# Patient Record
Sex: FEMALE | Race: WHITE | NOT HISPANIC OR LATINO | URBAN - METROPOLITAN AREA
[De-identification: names, ages, dates, MRNs, and addresses within clinical notes are randomized per-mention and may not be internally consistent; named-entity substitution may affect disease eponyms.]

---

## 2017-04-15 ENCOUNTER — OUTPATIENT (OUTPATIENT)
Dept: OUTPATIENT SERVICES | Facility: HOSPITAL | Age: 37
LOS: 1 days | Discharge: HOME | End: 2017-04-15

## 2017-06-24 ENCOUNTER — OUTPATIENT (OUTPATIENT)
Dept: OUTPATIENT SERVICES | Facility: HOSPITAL | Age: 37
LOS: 1 days | Discharge: HOME | End: 2017-06-24

## 2017-06-27 DIAGNOSIS — N92.6 IRREGULAR MENSTRUATION, UNSPECIFIED: ICD-10-CM

## 2017-06-28 DIAGNOSIS — N92.6 IRREGULAR MENSTRUATION, UNSPECIFIED: ICD-10-CM

## 2018-02-09 ENCOUNTER — INPATIENT (INPATIENT)
Facility: HOSPITAL | Age: 38
LOS: 1 days | Discharge: HOME | End: 2018-02-11
Attending: INTERNAL MEDICINE

## 2018-02-09 VITALS
HEART RATE: 76 BPM | DIASTOLIC BLOOD PRESSURE: 78 MMHG | RESPIRATION RATE: 17 BRPM | OXYGEN SATURATION: 99 % | SYSTOLIC BLOOD PRESSURE: 150 MMHG | TEMPERATURE: 98 F

## 2018-02-09 DIAGNOSIS — Z98.890 OTHER SPECIFIED POSTPROCEDURAL STATES: Chronic | ICD-10-CM

## 2018-02-09 DIAGNOSIS — I63.9 CEREBRAL INFARCTION, UNSPECIFIED: ICD-10-CM

## 2018-02-09 DIAGNOSIS — M54.12 RADICULOPATHY, CERVICAL REGION: ICD-10-CM

## 2018-02-09 LAB
ALBUMIN SERPL ELPH-MCNC: 4 G/DL — SIGNIFICANT CHANGE UP (ref 3–5.5)
ALP SERPL-CCNC: 52 U/L — SIGNIFICANT CHANGE UP (ref 30–115)
ALT FLD-CCNC: 22 U/L — SIGNIFICANT CHANGE UP (ref 0–41)
ANION GAP SERPL CALC-SCNC: 4 MMOL/L — LOW (ref 7–14)
APTT BLD: 27.7 SEC — SIGNIFICANT CHANGE UP (ref 27–39.2)
AST SERPL-CCNC: 19 U/L — SIGNIFICANT CHANGE UP (ref 0–41)
BASOPHILS # BLD AUTO: 0.04 K/UL — SIGNIFICANT CHANGE UP (ref 0–0.2)
BASOPHILS NFR BLD AUTO: 0.6 % — SIGNIFICANT CHANGE UP (ref 0–1)
BILIRUB SERPL-MCNC: 0.6 MG/DL — SIGNIFICANT CHANGE UP (ref 0.2–1.2)
BUN SERPL-MCNC: 7 MG/DL — LOW (ref 10–20)
CALCIUM SERPL-MCNC: 9.1 MG/DL — SIGNIFICANT CHANGE UP (ref 8.5–10.1)
CHLORIDE SERPL-SCNC: 110 MMOL/L — SIGNIFICANT CHANGE UP (ref 98–110)
CHOLEST SERPL-MCNC: 146 MG/DL — SIGNIFICANT CHANGE UP (ref 100–200)
CK MB BLD-MCNC: 1 % — SIGNIFICANT CHANGE UP (ref 0–4)
CK MB CFR SERPL CALC: 1 NG/ML — SIGNIFICANT CHANGE UP (ref 0.6–6.3)
CK SERPL-CCNC: 84 U/L — SIGNIFICANT CHANGE UP (ref 0–225)
CO2 SERPL-SCNC: 25 MMOL/L — SIGNIFICANT CHANGE UP (ref 17–32)
CREAT SERPL-MCNC: 0.7 MG/DL — SIGNIFICANT CHANGE UP (ref 0.7–1.5)
EOSINOPHIL # BLD AUTO: 0.42 K/UL — SIGNIFICANT CHANGE UP (ref 0–0.7)
EOSINOPHIL NFR BLD AUTO: 6.7 % — SIGNIFICANT CHANGE UP (ref 0–8)
GAS PNL BLDV: SIGNIFICANT CHANGE UP
GLUCOSE SERPL-MCNC: 83 MG/DL — SIGNIFICANT CHANGE UP (ref 70–110)
HCT VFR BLD CALC: 39.3 % — SIGNIFICANT CHANGE UP (ref 37–47)
HGB BLD-MCNC: 13.2 G/DL — LOW (ref 14–18)
IMM GRANULOCYTES NFR BLD AUTO: 0.3 % — SIGNIFICANT CHANGE UP (ref 0.1–0.3)
INR BLD: 0.89 RATIO — SIGNIFICANT CHANGE UP (ref 0.65–1.3)
LYMPHOCYTES # BLD AUTO: 1.94 K/UL — SIGNIFICANT CHANGE UP (ref 1.2–3.4)
LYMPHOCYTES # BLD AUTO: 31.1 % — SIGNIFICANT CHANGE UP (ref 20.5–51.1)
MCHC RBC-ENTMCNC: 30.1 PG — SIGNIFICANT CHANGE UP (ref 27–31)
MCHC RBC-ENTMCNC: 33.6 G/DL — SIGNIFICANT CHANGE UP (ref 32–37)
MCV RBC AUTO: 89.7 FL — SIGNIFICANT CHANGE UP (ref 81–91)
MONOCYTES # BLD AUTO: 0.38 K/UL — SIGNIFICANT CHANGE UP (ref 0.1–0.6)
MONOCYTES NFR BLD AUTO: 6.1 % — SIGNIFICANT CHANGE UP (ref 1.7–9.3)
NEUTROPHILS # BLD AUTO: 3.44 K/UL — SIGNIFICANT CHANGE UP (ref 1.4–6.5)
NEUTROPHILS NFR BLD AUTO: 55.2 % — SIGNIFICANT CHANGE UP (ref 42.2–75.2)
PLATELET # BLD AUTO: 274 K/UL — SIGNIFICANT CHANGE UP (ref 130–400)
POTASSIUM SERPL-MCNC: 3.9 MMOL/L — SIGNIFICANT CHANGE UP (ref 3.5–5)
POTASSIUM SERPL-SCNC: 3.9 MMOL/L — SIGNIFICANT CHANGE UP (ref 3.5–5)
PROT SERPL-MCNC: 6.3 G/DL — SIGNIFICANT CHANGE UP (ref 6–8)
PROTHROM AB SERPL-ACNC: 9.6 SEC — LOW (ref 9.95–12.87)
RBC # BLD: 4.38 M/UL — SIGNIFICANT CHANGE UP (ref 4.2–5.4)
RBC # FLD: 12.1 % — SIGNIFICANT CHANGE UP (ref 11.5–14.5)
SODIUM SERPL-SCNC: 139 MMOL/L — SIGNIFICANT CHANGE UP (ref 135–146)
TROPONIN I SERPL-MCNC: <0.02 NG/ML — SIGNIFICANT CHANGE UP (ref 0–0.05)
WBC # BLD: 6.24 K/UL — SIGNIFICANT CHANGE UP (ref 4.8–10.8)
WBC # FLD AUTO: 6.24 K/UL — SIGNIFICANT CHANGE UP (ref 4.8–10.8)

## 2018-02-09 RX ORDER — SODIUM CHLORIDE 9 MG/ML
1000 INJECTION INTRAMUSCULAR; INTRAVENOUS; SUBCUTANEOUS
Qty: 0 | Refills: 0 | Status: DISCONTINUED | OUTPATIENT
Start: 2018-02-09 | End: 2018-02-11

## 2018-02-09 RX ADMIN — SODIUM CHLORIDE 125 MILLILITER(S): 9 INJECTION INTRAMUSCULAR; INTRAVENOUS; SUBCUTANEOUS at 20:51

## 2018-02-09 NOTE — H&P ADULT - HISTORY OF PRESENT ILLNESS
37 F PMH 1ppd smoking history x 20 years, pw left facial numbness and weakness, upper and lower with left  weakness, starting 3 hours PTA, no trauma, recent illness, other numbness or weakness, headache, vision changes, cough, SOB, pain of any body part.  Exam: HEENT nl, no OP erythema or swelling of oral structures, EOMI, PERRLA 4mm, neck supple nontender, nl ROM, no stepoff, heart RRR, lungs bcta, chest nontender, back nontender in TLS spine. abd ntnd, ext nontender, nl rom, no deformity. Neuro A&Ox3, Left UE no drift, weakness in , elbow extension, and flexion, normal strength 5/5 all 3 other ext, nl sensation throughout limbs, normal speech and coordination, CN VII weakness on left upper and lower, and CN V left facial altered sensation, CN II-XII intact otherwise.  A/P: Stroke code called, spoke with Dr. Headley regarding NIHSS of 1, instructed to monitor on hourly basis if worsening, which may show indication to extend TPA window, as patient has no contraindications after her CT head was negative. Labs, imaging, monitor, EKG, reassess. Likely Stroke unit Disposition.    38 yo right handed female with h/o 20 pack year smoking history, cervical and lumbar herniated disc, right  knee surgery PCOS (not on ocps)chronic headaches, asthma p.w acute onset left face and arm numbness and paresthesias. Patient states that she was having a usual day when around 4.30 am she suddenly experienced sharp stabbing pain in the left shoulder which then radiated to the left face (vi,v2,v3 divisions)and  the left arm , a/w numbness and tingling. Patient states that she is experiencing numbness and paresthesias inside her left cheek and lateral aspect of the tongue on the left side. Denies nausea vomiting dizziness, speech visual deficits ,headaches or focal weakness. Patient currently states that her symptoms have minimally improved since onset.Patient denies h/o blood clots ,use of oral contraceptives ,miscarriages , or h/o autoimmune disorders. 37 F recently diagnosed with PCOS, cervical and lumbar radiculopathy, active smoker (20 p.y), p/w left facial numbness and weakness, left UE numbness and  weakness, starting 3 hours PTA.  as per patient, her symptoms were acute in onset and started as a sharp pain in her L chest that traveled to her L UE and then to her L face.   Patient states that she is experiencing numbness and paresthesias on her left cheek.   no nausea vomiting dizziness, speech visual deficits ,headaches or focal weakness.   Pt denies any motor weakness, no trauma, no recent illness, no other numbness or weakness, no headache, no vision changes, no cough/SOB, no other GI//Resp sx.    no h/o VTE, pt does not use OCPs, no miscarriages, no h/o autoimmune disorders.  ROS otherwise negative.

## 2018-02-09 NOTE — CONSULT NOTE ADULT - ASSESSMENT
38 yo right handed female with h/o PCOS (not on meds)cervical and lumbar disc herniation,20 pack yr smoker, asthma ,chronic headache ,p.w acute onset left shoulder and neck pain radiating to the left face and arm a/w numbness and paresthesias.    r/o CVA  r/o cervical radiculopathy 36 yo right handed female with h/o PCOS (not on meds)cervical and lumbar disc herniation,20 pack yr smoker, asthma ,chronic headache ,p.w acute onset left shoulder and neck pain radiating to the left posterior head and arm a/w numbness and paresthesias.    r/o cervical radiculopathy

## 2018-02-09 NOTE — ED PROVIDER NOTE - NS ED ROS FT
Review of Systems:  	•	CONSTITUTIONAL - no fever, no diaphoresis, no chills  	•	SKIN - no rash  	•	RESPIRATORY - no shortness of breath, no cough  	•	CARDIAC - no chest pain, no palpitations  	•	GI - no abd pain, no nausea, no vomiting, no diarrhea, no constipation  	•	GENITO-URINARY - no discharge, no dysuria; no hematuria, no increased urinary frequency  	•	MUSCULOSKELETAL - no joint paint, no swelling, no redness  	•	NEUROLOGIC - +left hand weakness, left facial drop, no headache, no LOC

## 2018-02-09 NOTE — H&P ADULT - ASSESSMENT
36 yo right handed female with h/o PCOS (not on meds)cervical and lumbar disc herniation,20 pack yr smoker, asthma ,chronic headache ,p.w acute onset left shoulder and neck pain radiating to the left face and arm a/w numbness and paresthesias.    r/o CVA  r/o cervical radiculopathy      -N/c mri brain  -MRA Head N/C  -MRA neck With contrast  -Smoking cessation education(Nicotine patch)  -start asa 81 mg and LIpitor 40 mg  -echo  -Lipid profile, Hbaic  -speech swallow eval  -admit to stroke unit.     Problem/Recommendation - 2:  ·  Problem: Cervical radiculopathy.  Recommendation: -continue tramadol 50 mg bid prn. 36 yo R handed F with h/o PCOS, cervical and lumbar radiculopathy, 20 p.y smoker, p.w acute onset L shoulder and neck pain radiating to the left face and arm a/w numbness and paresthesias in L side of her face and L UE    1- L facial and UE parasthesias:   ddx: CVA vs. cervical radiculopathy     admit to stroke unit  patient did not receive TPA in ED as she was not a candidate due to time of presentation and NHSS score of 1  neurology following, will get MRI head, MRA & neck  check 2 d echo  check LDL and HbA1c  ASA 81 / lipitor 80 qHS  check JOSUE, anti-PL    2- radiculopathy:   continue tramadol 50 mg bid prn  c/w gabapenting 300 q8    3- GI ppx: no need  DVT ppx: lovenox 40 q24    4- full code

## 2018-02-09 NOTE — ED PROVIDER NOTE - PHYSICAL EXAMINATION
Alert, NAD, WDWN, non-toxic appearing  PERRL, EOMI, normal pupils, no icterus, normal external ENT, pink/moist membranes, pharynx normal  Airway intact, normal resp effort w/o tachypnea, speaking full sentences, lungs CTA b/l  CVS1S2, RRR, no m/g/r, no JVD, 2+ pulses b/l, no edema, warm/well-perfused  Abdomen soft, no tender/dist/guard/rebound, no CVA tender  AAOx3, +left facial droop and numbness, no slurred speech, no gaze palsy, +left hand weakness, no drift, no decreased sensation, normal gait

## 2018-02-09 NOTE — H&P ADULT - NSHPSOCIALHISTORY_GEN_ALL_CORE
smokin ppd x 20 y (20 p.y)  alcohol:  drug use: smokin ppd x 20 y (20 p.y)  alcohol: denies  drug use: denies

## 2018-02-09 NOTE — CHART NOTE - NSCHARTNOTEFT_GEN_A_CORE
Called for stroke alert:  37 F w/ h/o tobacco use p/w acute L facial droop with minimal numbness and minimal L  weakness starting 3.5 hour prior.  Denies any vertigo, incoordination or limb weakness/numbness. No visual changes. Symptoms currently persistent.  NIHSS 1 per ED attending.  HCT negative.  Pt is not a candidate for thrombolysis with IV TPA in 4.5 hr window with low NIHSS.  Recommend continue serial neurochecks during TPA window and contact us immediately if symptoms progress. Otherwise admit for further management and workup.  Discussed with Dr. Yousif who agrees with current management and plan.

## 2018-02-09 NOTE — H&P ADULT - NSHPLABSRESULTS_GEN_ALL_CORE
13.2   6.24  )-----------( 274      ( 09 Feb 2018 19:10 )             39.3       02-09    139  |  110  |  7<L>  ----------------------------<  83  3.9   |  25  |  0.7    Ca    9.1      09 Feb 2018 19:10    TPro  6.3  /  Alb  4.0  /  TBili  0.6  /  DBili  x   /  AST  19  /  ALT  22  /  AlkPhos  52  02-09    PT/INR - ( 09 Feb 2018 19:10 )   PT: 9.60 sec;   INR: 0.89 ratio         PTT - ( 09 Feb 2018 19:10 )  PTT:27.7 sec    CARDIAC MARKERS ( 09 Feb 2018 19:10 )  <0.02 ng/mL / x     / 84 U/L / x     / 1.0 ng/mL    CAPILLARY BLOOD GLUCOSE  83 (09 Feb 2018 19:00)    < from: CT Head No Cont (02.09.18 @ 19:07) >    IMPRESSION:  1. No CT evidence of acute intracranial pathology.  2. Left ethmoid sinus disease.    < end of copied text >

## 2018-02-09 NOTE — CONSULT NOTE ADULT - SUBJECTIVE AND OBJECTIVE BOX
CC: Left arm and face numbness and paresthesias.    HPI: 36 yo right handed female with h/o 20 pack year smoking history, cervical and lumbar herniated disc, right  knee surgery PCOS (not on ocps)chronic headaches, asthma p.w acute onset left face and arm numbness and paresthesias. Patient states that she was having a usual day when around 4.30 am she suddenly experienced sharp stabbing pain in the left shoulder which then radiated to the left face (vi,v2,v3 divisions)and  the left arm , a/w numbness and tingling. Patient states that she is experiencing numbness and paresthesias inside her left cheek and lateral aspect of the tongue on the left side. Denies nausea vomiting dizziness, speech visual deficits ,headaches or focal weakness. Patient currently states that her symptoms have minimally improved since onset.Patient denies h/o blood clots ,use of oral contraceptives ,miscarriages , or h/o autoimmune disorders.    Family history: mother with HTN, father with cva    Home medications:  -Gabapentin 400 mg prn  -Robaxin prn  -Tramadol 50 mg prn      ROS:  Constitutional, Neurological, Psychiatric, Eyes, ENT, Cardiovascular, Respiratory, Gastrointestinal, Genitourinary, Musculoskeletal, Integumentary, Endocrine and Heme/Lymph are otherwise negative.     Physical Exam:  Constitutional: alert and in no acute distress.  Eyes: the sclera and conjunctiva were normal, pupils were equal in size, round, reactive to light, with normal accommodation and extraocular movements were intact.   Back: no costovertebral angle tenderness and no spinal tenderness.      Neuro Exam:  Orientation: oriented to person, place time and situation  Attention: normal concentrating ability and visual attention was not decreased.   Language: no difficulty naming common objects, no difficulty repeating a phrase, no difficulty writing a sentence, fluency intact, comprehension intact and reading intact.   Fund of knowledge: displays adequate knowledge of personal past history.   Cranial Nerves: visual acuity intact bilaterally, visual fields full to confrontation, pupils equal round and reactive to light, extraocular motion intact, facial sensation intact symmetrically, face symmetrical, hearing was intact bilaterally, tongue and palate midline, head turning and shoulder shrug symmetric and there was no tongue deviation with protrusion.   Motor: muscle tone was normal in all four extremities, muscle strength was normal in all four extremities and normal bulk in all four extremities.   Sensory exam: decreased to pinprick temperature on the left face and left arm  Coordination:. no dysmetria/ past pointing or limb ataxia  Gait: deferred  Deep tendon reflexes:   Biceps right 2+. Biceps left 2+.    Triceps right 2+. Triceps left 2+.  LOC  Brachioradialis right 2+. Brachioradialis left 2+.    Patella right 2+. Patella left 2+.    Ankle jerk right 2+. Ankle jerk left 2+.   Plantar responses normal on the right, normal on the left.      NIHSS: 1    Allergies    erythromycin (Unknown)    Intolerances      MEDICATIONS  (STANDING):  sodium chloride 0.9%. 1000 milliLiter(s) (125 mL/Hr) IV Continuous <Continuous>    MEDICATIONS  (PRN):      LABS:                        13.2   6.24  )-----------( 274      ( 09 Feb 2018 19:10 )             39.3     02-09    139  |  110  |  7<L>  ----------------------------<  83  3.9   |  25  |  0.7    Ca    9.1      09 Feb 2018 19:10    TPro  6.3  /  Alb  4.0  /  TBili  0.6  /  DBili  x   /  AST  19  /  ALT  22  /  AlkPhos  52  02-09    PT/INR - ( 09 Feb 2018 19:10 )   PT: 9.60 sec;   INR: 0.89 ratio         PTT - ( 09 Feb 2018 19:10 )  PTT:27.7 sec        Neuro Imaging:  < from: CT Head No Cont (02.09.18 @ 19:07) >  No CT evidence of acute intracranial pathology.  2. Left ethmoid sinus disease.      < end of copied text >      EEG:     Echo:   Carotid Doppler:   Telemetry: CC: Left arm and face numbness and paresthesias.    HPI: 38 yo right handed female with h/o 20 pack year smoking history, cervical and lumbar herniated disc, right  knee surgery PCOS (not on ocps)chronic headaches, asthma p.w acute onset left face and arm numbness and paresthesias. Patient states that she was having a usual day when around 4.30 am she suddenly experienced sharp stabbing pain in the left shoulder which then radiated to the post-auricular area and down the left arm , a/w numbness and tingling. Patient states that she is experiencing numbness and paresthesias inside her left cheek and lateral aspect of the tongue on the left side.  symptoms lasted few minutes with spontaneous resolution.  than had a recurrence of symptoms which brought her to the hospital.  Denies nausea vomiting dizziness, speech visual deficits ,headaches or focal weakness. Patient currently states that her symptoms have minimally improved since onset.Patient denies h/o blood clots ,use of oral contraceptives ,miscarriages , or h/o autoimmune disorders.  currently back to baseline.    Family history: mother with HTN, father with cva    Home medications:  -Gabapentin 400 mg prn  -Robaxin prn  -Tramadol 50 mg prn      ROS:  Constitutional, Neurological, Psychiatric, Eyes, ENT, Cardiovascular, Respiratory, Gastrointestinal, Genitourinary, Musculoskeletal, Integumentary, Endocrine and Heme/Lymph are otherwise negative.     Physical Exam:  Constitutional: alert and in no acute distress.  Eyes: the sclera and conjunctiva were normal, pupils were equal in size, round, reactive to light, with normal accommodation and extraocular movements were intact.   Back: no costovertebral angle tenderness and no spinal tenderness.      Neuro Exam:  Orientation: oriented to person, place time and situation  Attention: normal concentrating ability and visual attention was not decreased.   Language: no difficulty naming common objects, no difficulty repeating a phrase, no difficulty writing a sentence, fluency intact, comprehension intact and reading intact.   Fund of knowledge: displays adequate knowledge of personal past history.   Cranial Nerves: visual acuity intact bilaterally, visual fields full to confrontation, pupils equal round and reactive to light, extraocular motion intact, facial sensation intact symmetrically, face symmetrical, hearing was intact bilaterally, tongue and palate midline, head turning and shoulder shrug symmetric and there was no tongue deviation with protrusion.   Motor: muscle tone was normal in all four extremities, muscle strength was normal in all four extremities and normal bulk in all four extremities.   Sensory exam: decreased to pinprick temperature on the left face and left arm  Coordination:. no dysmetria/ past pointing or limb ataxia  Gait: deferred  Deep tendon reflexes:   Biceps right 2+. Biceps left 2+.    Triceps right 2+. Triceps left 2+.  LOC  Brachioradialis right 2+. Brachioradialis left 2+.    Patella right 2+. Patella left 2+.    Ankle jerk right 2+. Ankle jerk left 2+.   Plantar responses normal on the right, normal on the left.      NIHSS: 1 (0 on my exam)    Allergies    erythromycin (Unknown)    Intolerances    MEDICATIONS  (STANDING):  sodium chloride 0.9%. 1000 milliLiter(s) (125 mL/Hr) IV Continuous <Continuous>    MEDICATIONS  (PRN):    LABS:                        13.2   6.24  )-----------( 274      ( 09 Feb 2018 19:10 )             39.3     02-09    139  |  110  |  7<L>  ----------------------------<  83  3.9   |  25  |  0.7    Ca    9.1      09 Feb 2018 19:10    TPro  6.3  /  Alb  4.0  /  TBili  0.6  /  DBili  x   /  AST  19  /  ALT  22  /  AlkPhos  52  02-09    PT/INR - ( 09 Feb 2018 19:10 )   PT: 9.60 sec;   INR: 0.89 ratio         PTT - ( 09 Feb 2018 19:10 )  PTT:27.7 sec      Neuro Imaging:  < from: CT Head No Cont (02.09.18 @ 19:07) >  No CT evidence of acute intracranial pathology.  2. Left ethmoid sinus disease.      < end of copied text >

## 2018-02-09 NOTE — CONSULT NOTE ADULT - ATTENDING COMMENTS
38 yo right handed female with h/o PCOS (not on meds)cervical and lumbar disc herniation,20 pack yr smoker, asthma ,chronic headache ,p.w transient acute onset left shoulder and neck pain radiating to the left posterior head and arm a/w numbness and paresthesias suspicious for cervicalgia.  Currently nonfocal.  Doubt stroke.  r/o cervical radiculopathy.    Plan:  - MRI Cervical spine NC  - if MRI (-), may f/u as outpt in 2 wks  - if patient does not want to stay for MRI, recommend d/c and contact our office to arrange for outpt MRI and further w/u.

## 2018-02-09 NOTE — ED PROVIDER NOTE - OBJECTIVE STATEMENT
37 yr old female, no sig PMH, presenting with left facial droop and numbness, and left hand weakness that started at 4 pm today, denies any headaches, neck pain, abd pain, chest pain, or SOB. Does not take any medications

## 2018-02-09 NOTE — CONSULT NOTE ADULT - CONSULT REQUESTED BY NAME
"Subjective:       Patient ID: Aby Correa is a 24 y.o. female.    Chief Complaint: Mass    HPI     The patient presents she also has complaints of pain in the left axilla, radiating to her left breast.  She had recently been treated for a staph infection, with doxycycline.  When she began having these symptoms, she restarted doxycycline 2 days ago.    The following portions of the patient's history were reviewed and updated as appropriate: allergies, current medications, past family history, past medical history, past social history, past surgical history and problem list.    Review of Systems    Other than history of present illness, noncontributory.    Objective:      Physical Exam    Inflamed nodules noted in the left axilla; mild calor, tender to touch.  Left breast unremarkable.              Assessment:       1. Hidradenitis suppurativa of left axilla        Plan:       Discontinue doxycycline; trial of tetracycline.  Refer to general surgery for evaluation a few days.  Warm compresses to area 2-3 times daily.    "This note will not be shared with the patient."  " medical team

## 2018-02-09 NOTE — CONSULT NOTE ADULT - PROBLEM SELECTOR RECOMMENDATION 9
-N/c mri brain  -MRA Head N/C  -MRA neck With contrast  -echo  -Lipid profile, hbaic  -speech swallow eval  -admit to stroke unit -N/c mri brain  -MRA Head N/C  -MRA neck With contrast  -start asa 81 mg and LIpitor 40 mg  -echo  -Lipid profile, Hbaic  -speech swallow eval  -admit to stroke unit -N/c mri brain  -MRA Head N/C  -MRA neck With contrast  -Smoking cessation education(Nicotine patch)  -start asa 81 mg and LIpitor 40 mg  -echo  -Lipid profile, Hbaic  -speech swallow eval  -admit to stroke unit

## 2018-02-09 NOTE — H&P ADULT - NSHPPHYSICALEXAM_GEN_ALL_CORE
T(C): 36.7 (02-09-18 @ 18:46), Max: 36.7 (02-09-18 @ 18:46)  HR: 66 (02-09-18 @ 21:00) (66 - 76)  BP: 110/64 (02-09-18 @ 21:00) (110/64 - 150/78)  RR: 18 (02-09-18 @ 21:00) (17 - 18)  SpO2: 100% (02-09-18 @ 21:00) (99% - 100%)    PHYSICAL EXAM:    GENERAL: NAD, well-developed  HEAD:  Atraumatic, Normocephalic  EYES:   NECK: Supple, No JVD  CHEST/LUNG: Clear to auscultation bilaterally; No wheeze  HEART: Regular rate and rhythm; No murmurs, rubs, or gallops  ABDOMEN: Soft, Nontender, Nondistended; Bowel sounds present  EXTREMITIES:  2+ Peripheral Pulses, No clubbing, cyanosis, or edema  PSYCH: AAOx3  NEUROLOGY:   SKIN: No rashes or lesions T(C): 36.7 (02-09-18 @ 18:46), Max: 36.7 (02-09-18 @ 18:46)  HR: 66 (02-09-18 @ 21:00) (66 - 76)  BP: 110/64 (02-09-18 @ 21:00) (110/64 - 150/78)  RR: 18 (02-09-18 @ 21:00) (17 - 18)  SpO2: 100% (02-09-18 @ 21:00) (99% - 100%)    PHYSICAL EXAM:    GENERAL: NAD, well-developed  HEAD:  Atraumatic, Normocephalic  EYES: PERRL, EOMI  NECK: Supple, No JVD  CHEST/LUNG: Clear to auscultation bilaterally; No wheeze  HEART: Regular rate and rhythm; No murmurs, rubs, or gallops  ABDOMEN: Soft, Nontender, Nondistended; Bowel sounds present  EXTREMITIES:  2+ Peripheral Pulses, No clubbing, cyanosis, or edema  PSYCH: AAOx3    NEUROLOGY: 5/5 all extremities except 4/5 L UE - CN II-XII WNL except V1, V2, V3  no gait abnormalities, pronator drift negative    SKIN: No rashes or lesions
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
## 2018-02-09 NOTE — ED PROVIDER NOTE - ATTENDING CONTRIBUTION TO CARE
37 F PMH 1ppd smoking history x 20 years, pw left facial numbness and weakness, upper and lower with left  weakness, starting 3 hours PTA, no trauma, recent illness, other numbness or weakness, headache, vision changes, cough, SOB, pain of any body part.  Exam: HEENT nl, no OP erythema or swelling of oral structures, EOMI, PERRLA 4mm, neck supple nontender, nl ROM, no stepoff, heart RRR, lungs bcta, chest nontender, back nontender in TLS spine. abd ntnd, ext nontender, nl rom, no deformity. Neuro A&Ox3, Left UE no drift, weakness in , elbow extension, and flexion, normal strength 5/5 all 3 other ext, nl sensation throughout limbs, normal speech and coordination, CN VII weakness on left upper and lower, and CN V left facial altered sensation, CN II-XII intact otherwise.  A/P: Stroke code called, spoke with Dr. Headley regarding NIHSS of 1, instructed to monitor on hourly basis if worsening, which may show indication to extend TPA window, as patient has no contraindications after her CT head was negative. Labs, imaging, monitor, EKG, reassess. Likely Stroke unit Disposition.

## 2018-02-10 LAB
APPEARANCE UR: CLEAR — SIGNIFICANT CHANGE UP
BILIRUB UR-MCNC: NEGATIVE — SIGNIFICANT CHANGE UP
COLOR SPEC: YELLOW — SIGNIFICANT CHANGE UP
DIFF PNL FLD: NEGATIVE — SIGNIFICANT CHANGE UP
GLUCOSE UR QL: NEGATIVE MG/DL — SIGNIFICANT CHANGE UP
HCG UR QL: NEGATIVE — SIGNIFICANT CHANGE UP
KETONES UR-MCNC: NEGATIVE — SIGNIFICANT CHANGE UP
LEUKOCYTE ESTERASE UR-ACNC: NEGATIVE — SIGNIFICANT CHANGE UP
NITRITE UR-MCNC: NEGATIVE — SIGNIFICANT CHANGE UP
PH UR: 6 — SIGNIFICANT CHANGE UP (ref 5–8)
PROT UR-MCNC: NEGATIVE MG/DL — SIGNIFICANT CHANGE UP
SP GR SPEC: 1.02 — SIGNIFICANT CHANGE UP (ref 1.01–1.03)
UROBILINOGEN FLD QL: 0.2 MG/DL — SIGNIFICANT CHANGE UP (ref 0.2–0.2)

## 2018-02-10 RX ORDER — ASPIRIN/CALCIUM CARB/MAGNESIUM 324 MG
81 TABLET ORAL ONCE
Qty: 0 | Refills: 0 | Status: COMPLETED | OUTPATIENT
Start: 2018-02-10 | End: 2018-02-10

## 2018-02-10 RX ORDER — ASPIRIN/CALCIUM CARB/MAGNESIUM 324 MG
81 TABLET ORAL DAILY
Qty: 0 | Refills: 0 | Status: DISCONTINUED | OUTPATIENT
Start: 2018-02-10 | End: 2018-02-11

## 2018-02-10 RX ORDER — GABAPENTIN 400 MG/1
400 CAPSULE ORAL THREE TIMES A DAY
Qty: 0 | Refills: 0 | Status: DISCONTINUED | OUTPATIENT
Start: 2018-02-10 | End: 2018-02-11

## 2018-02-10 RX ORDER — ATORVASTATIN CALCIUM 80 MG/1
80 TABLET, FILM COATED ORAL AT BEDTIME
Qty: 0 | Refills: 0 | Status: DISCONTINUED | OUTPATIENT
Start: 2018-02-10 | End: 2018-02-11

## 2018-02-10 RX ORDER — TRAMADOL HYDROCHLORIDE 50 MG/1
50 TABLET ORAL EVERY 12 HOURS
Qty: 0 | Refills: 0 | Status: DISCONTINUED | OUTPATIENT
Start: 2018-02-10 | End: 2018-02-11

## 2018-02-10 RX ORDER — ENOXAPARIN SODIUM 100 MG/ML
40 INJECTION SUBCUTANEOUS DAILY
Qty: 0 | Refills: 0 | Status: DISCONTINUED | OUTPATIENT
Start: 2018-02-10 | End: 2018-02-11

## 2018-02-10 RX ADMIN — ATORVASTATIN CALCIUM 80 MILLIGRAM(S): 80 TABLET, FILM COATED ORAL at 22:48

## 2018-02-10 RX ADMIN — Medication 81 MILLIGRAM(S): at 01:31

## 2018-02-10 RX ADMIN — ATORVASTATIN CALCIUM 80 MILLIGRAM(S): 80 TABLET, FILM COATED ORAL at 01:31

## 2018-02-10 RX ADMIN — Medication 81 MILLIGRAM(S): at 12:30

## 2018-02-10 RX ADMIN — GABAPENTIN 400 MILLIGRAM(S): 400 CAPSULE ORAL at 15:48

## 2018-02-10 RX ADMIN — GABAPENTIN 400 MILLIGRAM(S): 400 CAPSULE ORAL at 22:46

## 2018-02-10 RX ADMIN — GABAPENTIN 400 MILLIGRAM(S): 400 CAPSULE ORAL at 06:00

## 2018-02-10 NOTE — ED ADULT NURSE NOTE - PMH
Asthma    Chronic bilateral low back pain without sciatica    PCOS (polycystic ovarian syndrome)    Radiculopathy

## 2018-02-10 NOTE — ED ADULT NURSE NOTE - CHPI ED SYMPTOMS NEG
no blurred vision/no change in level of consciousness/no vomiting/no confusion/no loss of consciousness/no nausea/no dizziness/no fever
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
## 2018-02-11 VITALS
TEMPERATURE: 99 F | HEART RATE: 77 BPM | DIASTOLIC BLOOD PRESSURE: 53 MMHG | RESPIRATION RATE: 18 BRPM | SYSTOLIC BLOOD PRESSURE: 107 MMHG

## 2018-02-11 RX ORDER — GABAPENTIN 400 MG/1
1 CAPSULE ORAL
Qty: 0 | Refills: 0 | COMMUNITY

## 2018-02-11 RX ORDER — TRAMADOL HYDROCHLORIDE 50 MG/1
50 TABLET ORAL
Qty: 0 | Refills: 0 | COMMUNITY

## 2018-02-11 RX ADMIN — GABAPENTIN 400 MILLIGRAM(S): 400 CAPSULE ORAL at 12:02

## 2018-02-11 RX ADMIN — GABAPENTIN 400 MILLIGRAM(S): 400 CAPSULE ORAL at 06:24

## 2018-02-11 RX ADMIN — Medication 81 MILLIGRAM(S): at 11:59

## 2018-02-11 NOTE — PROGRESS NOTE ADULT - SUBJECTIVE AND OBJECTIVE BOX
Patient is a 37y old  Female who presents with a chief complaint of L arm numbness, L facial numbness. (10 Feb 2018 04:24)      INTERVAL HPI/OVERNIGHT EVENTS: no event, no complains  HPI:  37 F recently diagnosed with PCOS, cervical and lumbar radiculopathy, active smoker (20 p.y), p/w left facial numbness and weakness, left UE numbness and  weakness, starting 3 hours PTA.  as per patient, her symptoms were acute in onset and started as a sharp pain in her L chest that traveled to her L UE and then to her L face.   Patient states that she is experiencing numbness and paresthesias on her left cheek.   no nausea vomiting dizziness, speech visual deficits ,headaches or focal weakness.   Pt denies any motor weakness, no trauma, no recent illness, no other numbness or weakness, no headache, no vision changes, no cough/SOB, no other GI//Resp sx.    no h/o VTE, pt does not use OCPs, no miscarriages, no h/o autoimmune disorders.  ROS otherwise negative. (2018 23:16)    PAST MEDICAL & SURGICAL HISTORY:    MEDICATIONS  (STANDING):  aspirin  chewable 81 milliGRAM(s) Oral daily  atorvastatin 80 milliGRAM(s) Oral at bedtime  enoxaparin Injectable 40 milliGRAM(s) SubCutaneous daily  gabapentin 400 milliGRAM(s) Oral three times a day  sodium chloride 0.9%. 1000 milliLiter(s) (125 mL/Hr) IV Continuous <Continuous>    MEDICATIONS  (PRN):  traMADol 50 milliGRAM(s) Oral every 12 hours PRN Moderate Pain (4 - 6)    Home Medications:  gabapentin 400 mg oral tablet: 1 tab(s) orally 3 times a day (10 Feb 2018 00:15)  traMADol: 50 milligram(s) orally 2 times a day, As Needed (10 Feb 2018 00:16)      Vital Signs Last 24 Hrs  T(C): 36.4 (2018 07:43), Max: 36.9 (10 Feb 2018 23:49)  T(F): 97.6 (2018 07:43), Max: 98.4 (10 Feb 2018 23:49)  HR: 72 (2018 07:43) (66 - 76)  BP: 102/56 (2018 07:43) (97/56 - 106/58)  BP(mean): --  RR: 18 (2018 07:43) (18 - 18)  SpO2: 97% (2018 03:32) (97% - 97%)  CAPILLARY BLOOD GLUCOSE          General: NAD, AAO3  CV: S1 S2  Pulm: decreased breath sounds at base  Abd: NT/ND/S +BS  EXT: no clubbing/cyanosis/edema  Neuro: nonfocal    LABS:                        13.2   6.24  )-----------( 274      ( 2018 19:10 )             39.3         139  |  110  |  7<L>  ----------------------------<  83  3.9   |  25  |  0.7    Ca    9.1      2018 19:10    TPro  6.3  /  Alb  4.0  /  TBili  0.6  /  DBili  x   /  AST  19  /  ALT  22  /  AlkPhos  52  09    LIVER FUNCTIONS - ( 2018 19:10 )  Alb: 4.0 g/dL / Pro: 6.3 g/dL / ALK PHOS: 52 U/L / ALT: 22 U/L / AST: 19 U/L / GGT: x           CARDIAC MARKERS ( 2018 19:10 )  <0.02 ng/mL / x     / 84 U/L / x     / 1.0 ng/mL      PT/INR - ( 2018 19:10 )   PT: 9.60 sec;   INR: 0.89 ratio         PTT - ( 2018 19:10 )  PTT:27.7 sec  Urinalysis Basic - ( 2018 19:10 )    Color: Yellow / Appearance: Clear / S.025 / pH: x  Gluc: x / Ketone: Negative  / Bili: Negative / Urobili: 0.2 mg/dL   Blood: x / Protein: Negative mg/dL / Nitrite: Negative   Leuk Esterase: Negative / RBC: x / WBC x   Sq Epi: x / Non Sq Epi: x / Bacteria: x          Pregnancy Profile, Urine: STAT (02-10-18 @ 15:11)  Provider to RN:       pt can go off tele for MRI (02-10-18 @ 20:21)

## 2018-02-11 NOTE — DISCHARGE NOTE ADULT - CARE PROVIDER_API CALL
Yaya Headley), Neurology; Neuromuscular Medicine  [address] [place] [address] [id number]  Phone: [phone]  Fax: [phone]    Internist,   Phone: (   )    -  Fax: (   )    -

## 2018-02-11 NOTE — DISCHARGE NOTE ADULT - CARE PLAN
Principal Discharge DX:	Cervical radiculopathy  Goal:	resolution  Assessment and plan of treatment:	cont current meds. F/u with neuro as oupt

## 2018-02-11 NOTE — PROGRESS NOTE ADULT - ASSESSMENT
36 yo R handed F with h/o PCOS, cervical and lumbar radiculopathy, 20 p.y smoker, p.w acute onset L shoulder and neck pain radiating to the left face and arm a/w numbness and paresthesias in L side of her face and L UE    1- L facial and UE parasthesias:   ddx: CVA vs. cervical radiculopathy   -f/u MRI head, MRA & neck  check LDL and HbA1c  ASA 81 / lipitor 80 qHS for now  check JOSUE, anti-PL    2- radiculopathy:   continue tramadol 50 mg bid prn  c/w gabapenting 300 q8    3- GI ppx: no need  DVT ppx: lovenox 40 q24    4- full code

## 2018-02-11 NOTE — PROGRESS NOTE ADULT - SUBJECTIVE AND OBJECTIVE BOX
Neurology Progress Note    Interval History:  Pt currently stable.  no new events.  Otherwise healthy.    Vital Signs Last 24 Hrs  T(C): 36.4 (2018 07:43), Max: 36.9 (10 Feb 2018 23:49)  T(F): 97.6 (2018 07:43), Max: 98.4 (10 Feb 2018 23:49)  HR: 72 (2018 07:43) (66 - 76)  BP: 102/56 (2018 07:43) (97/56 - 106/58)  BP(mean): --  RR: 18 (2018 07:43) (18 - 18)  SpO2: 97% (2018 03:32) (97% - 97%)    Neurological Exam:   Mental status: Awake, alert and oriented x3.  Recent and remote memory intact.  Naming, repetition and comprehension intact.  Attention/concentration intact.  No dysarthria, no aphasia.  Fund of knowledge appropriate.    Cranial nerves: Pupils equally round and reactive to light, visual fields full, no nystagmus, extraocular muscles intact, V1 through V3 intact bilaterally and symmetric, face symmetric, hearing intact to finger rub, palate elevation symmetric, tongue was midline.  Motor:  MRC grading 5/5 b/l UE/LE.   strength 5/5.  Normal tone and bulk.  No abnormal movements.    Sensation: Intact to light touch, proprioception, and pinprick.   Coordination: No dysmetria on finger-to-nose and heel-to-shin.  No dysdiadokinesia.  Reflexes: 2+ in bilateral UE/LE, downgoing toes bilaterally. (-) [place].  Gait: Narrow and steady. No ataxia.  Romberg negative    MEDICATIONS  (STANDING):  aspirin  chewable 81 milliGRAM(s) Oral daily  atorvastatin 80 milliGRAM(s) Oral at bedtime  enoxaparin Injectable 40 milliGRAM(s) SubCutaneous daily  gabapentin 400 milliGRAM(s) Oral three times a day  sodium chloride 0.9%. 1000 milliLiter(s) (125 mL/Hr) IV Continuous <Continuous>    MEDICATIONS  (PRN):  traMADol 50 milliGRAM(s) Oral every 12 hours PRN Moderate Pain (4 - 6)  Medications:    Labs:  CBC Full  -  ( 2018 19:10 )  WBC Count : 6.24 K/uL  Hemoglobin : 13.2 g/dL  Hematocrit : 39.3 %  Platelet Count - Automated : 274 K/uL  Mean Cell Volume : 89.7 fL  Mean Cell Hemoglobin : 30.1 pg  Mean Cell Hemoglobin Concentration : 33.6 g/dL  Auto Neutrophil # : 3.44 K/uL  Auto Lymphocyte # : 1.94 K/uL  Auto Monocyte # : 0.38 K/uL  Auto Eosinophil # : 0.42 K/uL  Auto Basophil # : 0.04 K/uL  Auto Neutrophil % : 55.2 %  Auto Lymphocyte % : 31.1 %  Auto Monocyte % : 6.1 %  Auto Eosinophil % : 6.7 %  Auto Basophil % : 0.6 %        139  |  110  |  7<L>  ----------------------------<  83  3.9   |  25  |  0.7    Ca    9.1      2018 19:10    TPro  6.3  /  Alb  4.0  /  TBili  0.6  /  DBili  x   /  AST  19  /  ALT  22  /  AlkPhos  52  -    LIVER FUNCTIONS - ( 2018 19:10 )  Alb: 4.0 g/dL / Pro: 6.3 g/dL / ALK PHOS: 52 U/L / ALT: 22 U/L / AST: 19 U/L / GGT: x           PT/INR - ( 2018 19:10 )   PT: 9.60 sec;   INR: 0.89 ratio         PTT - ( 2018 19:10 )  PTT:27.7 sec  Urinalysis Basic - ( 2018 19:10 )    Color: Yellow / Appearance: Clear / S.025 / pH: x  Gluc: x / Ketone: Negative  / Bili: Negative / Urobili: 0.2 mg/dL   Blood: x / Protein: Negative mg/dL / Nitrite: Negative   Leuk Esterase: Negative / RBC: x / WBC x   Sq Epi: x / Non Sq Epi: x / Bacteria: x    MRI Head (prelim) negative  MRA H/N (prelim) negative    Assessment:  This is a 37y Female w/ neck pain and L lower face/shoulder/arm numbness currently stable with negative workup.  suspect cervical disease.  can be worked up as outpatient.     Plan:  - f/u MRI/A official reports  - no need for echo  - if MRI/a (-), may d/c with outpt f/u in 4 wks

## 2018-02-11 NOTE — DISCHARGE NOTE ADULT - MEDICATION SUMMARY - MEDICATIONS TO TAKE
I will START or STAY ON the medications listed below when I get home from the hospital:    traMADol  -- 50 milligram(s) by mouth 2 times a day, As Needed  -- Indication: For Radiculopathy    gabapentin 400 mg oral tablet  -- 1 tab(s) by mouth 3 times a day  -- Indication: For Radiculopathy

## 2018-02-11 NOTE — DISCHARGE NOTE ADULT - PATIENT PORTAL LINK FT
You can access the [facility] Patient Portal, offered by [facility], by registering with the following website: [email or web address]

## 2018-02-13 DIAGNOSIS — M54.12 RADICULOPATHY, CERVICAL REGION: ICD-10-CM

## 2018-02-13 DIAGNOSIS — R20.0 ANESTHESIA OF SKIN: ICD-10-CM

## 2018-02-13 DIAGNOSIS — F17.210 NICOTINE DEPENDENCE, CIGARETTES, UNCOMPLICATED: ICD-10-CM

## 2018-02-13 DIAGNOSIS — M54.16 RADICULOPATHY, LUMBAR REGION: ICD-10-CM

## 2018-02-13 DIAGNOSIS — E28.2 POLYCYSTIC OVARIAN SYNDROME: ICD-10-CM

## 2018-06-12 ENCOUNTER — EMERGENCY (EMERGENCY)
Facility: HOSPITAL | Age: 38
LOS: 0 days | Discharge: HOME | End: 2018-06-12
Attending: EMERGENCY MEDICINE | Admitting: EMERGENCY MEDICINE

## 2018-06-12 VITALS
SYSTOLIC BLOOD PRESSURE: 111 MMHG | DIASTOLIC BLOOD PRESSURE: 66 MMHG | HEART RATE: 60 BPM | TEMPERATURE: 97 F | RESPIRATION RATE: 16 BRPM | OXYGEN SATURATION: 100 %

## 2018-06-12 VITALS
HEART RATE: 83 BPM | DIASTOLIC BLOOD PRESSURE: 85 MMHG | RESPIRATION RATE: 18 BRPM | HEIGHT: 68 IN | SYSTOLIC BLOOD PRESSURE: 130 MMHG | OXYGEN SATURATION: 98 % | TEMPERATURE: 98 F | WEIGHT: 179.9 LBS

## 2018-06-12 DIAGNOSIS — J45.909 UNSPECIFIED ASTHMA, UNCOMPLICATED: ICD-10-CM

## 2018-06-12 DIAGNOSIS — Z98.890 OTHER SPECIFIED POSTPROCEDURAL STATES: ICD-10-CM

## 2018-06-12 DIAGNOSIS — R10.31 RIGHT LOWER QUADRANT PAIN: ICD-10-CM

## 2018-06-12 DIAGNOSIS — R10.11 RIGHT UPPER QUADRANT PAIN: ICD-10-CM

## 2018-06-12 DIAGNOSIS — Z98.890 OTHER SPECIFIED POSTPROCEDURAL STATES: Chronic | ICD-10-CM

## 2018-06-12 DIAGNOSIS — Z79.891 LONG TERM (CURRENT) USE OF OPIATE ANALGESIC: ICD-10-CM

## 2018-06-12 DIAGNOSIS — R11.0 NAUSEA: ICD-10-CM

## 2018-06-12 DIAGNOSIS — Z88.1 ALLERGY STATUS TO OTHER ANTIBIOTIC AGENTS STATUS: ICD-10-CM

## 2018-06-12 LAB
ALBUMIN SERPL ELPH-MCNC: 4.5 G/DL — SIGNIFICANT CHANGE UP (ref 3.5–5.2)
ALP SERPL-CCNC: 55 U/L — SIGNIFICANT CHANGE UP (ref 30–115)
ALT FLD-CCNC: 15 U/L — SIGNIFICANT CHANGE UP (ref 0–41)
ANION GAP SERPL CALC-SCNC: 14 MMOL/L — SIGNIFICANT CHANGE UP (ref 7–14)
APTT BLD: 29.4 SEC — SIGNIFICANT CHANGE UP (ref 27–39.2)
AST SERPL-CCNC: 15 U/L — SIGNIFICANT CHANGE UP (ref 0–41)
BASOPHILS # BLD AUTO: 0.04 K/UL — SIGNIFICANT CHANGE UP (ref 0–0.2)
BASOPHILS NFR BLD AUTO: 0.4 % — SIGNIFICANT CHANGE UP (ref 0–1)
BILIRUB DIRECT SERPL-MCNC: <0.2 MG/DL — SIGNIFICANT CHANGE UP (ref 0–0.2)
BILIRUB INDIRECT FLD-MCNC: >0.1 MG/DL — LOW (ref 0.2–1.2)
BILIRUB SERPL-MCNC: 0.3 MG/DL — SIGNIFICANT CHANGE UP (ref 0.2–1.2)
BUN SERPL-MCNC: 10 MG/DL — SIGNIFICANT CHANGE UP (ref 10–20)
CALCIUM SERPL-MCNC: 9.5 MG/DL — SIGNIFICANT CHANGE UP (ref 8.5–10.1)
CHLORIDE SERPL-SCNC: 104 MMOL/L — SIGNIFICANT CHANGE UP (ref 98–110)
CO2 SERPL-SCNC: 24 MMOL/L — SIGNIFICANT CHANGE UP (ref 17–32)
CREAT SERPL-MCNC: 0.9 MG/DL — SIGNIFICANT CHANGE UP (ref 0.7–1.5)
EOSINOPHIL # BLD AUTO: 0.34 K/UL — SIGNIFICANT CHANGE UP (ref 0–0.7)
EOSINOPHIL NFR BLD AUTO: 3.8 % — SIGNIFICANT CHANGE UP (ref 0–8)
GLUCOSE SERPL-MCNC: 68 MG/DL — LOW (ref 70–99)
HCT VFR BLD CALC: 39.1 % — SIGNIFICANT CHANGE UP (ref 37–47)
HGB BLD-MCNC: 13.4 G/DL — SIGNIFICANT CHANGE UP (ref 12–16)
IMM GRANULOCYTES NFR BLD AUTO: 0.3 % — SIGNIFICANT CHANGE UP (ref 0.1–0.3)
INR BLD: 1.01 RATIO — SIGNIFICANT CHANGE UP (ref 0.65–1.3)
LIDOCAIN IGE QN: 32 U/L — SIGNIFICANT CHANGE UP (ref 7–60)
LYMPHOCYTES # BLD AUTO: 2.26 K/UL — SIGNIFICANT CHANGE UP (ref 1.2–3.4)
LYMPHOCYTES # BLD AUTO: 25.2 % — SIGNIFICANT CHANGE UP (ref 20.5–51.1)
MCHC RBC-ENTMCNC: 30.5 PG — SIGNIFICANT CHANGE UP (ref 27–31)
MCHC RBC-ENTMCNC: 34.3 G/DL — SIGNIFICANT CHANGE UP (ref 32–37)
MCV RBC AUTO: 88.9 FL — SIGNIFICANT CHANGE UP (ref 81–99)
MONOCYTES # BLD AUTO: 0.53 K/UL — SIGNIFICANT CHANGE UP (ref 0.1–0.6)
MONOCYTES NFR BLD AUTO: 5.9 % — SIGNIFICANT CHANGE UP (ref 1.7–9.3)
NEUTROPHILS # BLD AUTO: 5.76 K/UL — SIGNIFICANT CHANGE UP (ref 1.4–6.5)
NEUTROPHILS NFR BLD AUTO: 64.4 % — SIGNIFICANT CHANGE UP (ref 42.2–75.2)
NRBC # BLD: 0 /100 WBCS — SIGNIFICANT CHANGE UP (ref 0–0)
PLATELET # BLD AUTO: 318 K/UL — SIGNIFICANT CHANGE UP (ref 130–400)
POTASSIUM SERPL-MCNC: 3.6 MMOL/L — SIGNIFICANT CHANGE UP (ref 3.5–5)
POTASSIUM SERPL-SCNC: 3.6 MMOL/L — SIGNIFICANT CHANGE UP (ref 3.5–5)
PROT SERPL-MCNC: 7 G/DL — SIGNIFICANT CHANGE UP (ref 6–8)
PROTHROM AB SERPL-ACNC: 10.9 SEC — SIGNIFICANT CHANGE UP (ref 9.95–12.87)
RBC # BLD: 4.4 M/UL — SIGNIFICANT CHANGE UP (ref 4.2–5.4)
RBC # FLD: 12.2 % — SIGNIFICANT CHANGE UP (ref 11.5–14.5)
SODIUM SERPL-SCNC: 142 MMOL/L — SIGNIFICANT CHANGE UP (ref 135–146)
WBC # BLD: 8.96 K/UL — SIGNIFICANT CHANGE UP (ref 4.8–10.8)
WBC # FLD AUTO: 8.96 K/UL — SIGNIFICANT CHANGE UP (ref 4.8–10.8)

## 2018-06-12 RX ORDER — MORPHINE SULFATE 50 MG/1
6 CAPSULE, EXTENDED RELEASE ORAL ONCE
Qty: 0 | Refills: 0 | Status: DISCONTINUED | OUTPATIENT
Start: 2018-06-12 | End: 2018-06-12

## 2018-06-12 RX ADMIN — MORPHINE SULFATE 6 MILLIGRAM(S): 50 CAPSULE, EXTENDED RELEASE ORAL at 21:12

## 2018-06-12 NOTE — ED PROVIDER NOTE - PHYSICAL EXAMINATION
Physical Exam    Vital Signs: I have reviewed the initial vital signs.  Constitutional: well-nourished, appears stated age, no acute distress  Eyes: Conjunctiva pink, Sclera clear, PERRLA, EOMI.  Cardiovascular: S1 and S2, regular rate, regular rhythm, well-perfused extremities, radial pulses equal and 2+  Respiratory: unlabored respiratory effort, clear to auscultation bilaterally no wheezing, rales and rhonchi  Gastrointestinal: soft, + RUQ/RLQ tender abdomen, no pulsatile mass, normal bowl sounds, negative psoas, obturator, rovising and murphys signs   Musculoskeletal: supple neck, no lower extremity edema, no midline tenderness  Integumentary: warm, dry, no rash  Neurologic: awake, alert, cranial nerves II-XII grossly intact, extremities’ motor and sensory functions grossly intact  Psychiatric: appropriate mood, appropriate affect

## 2018-06-12 NOTE — ED ADULT TRIAGE NOTE - CHIEF COMPLAINT QUOTE
Sent from urgi to r/o appendicitis.  C/O right sided pain and low grade fever since 4pm.  "Feels like my insides are swelling"

## 2018-06-12 NOTE — ED PROVIDER NOTE - OBJECTIVE STATEMENT
38 year old female past medical history of PCOS and asthma states that she has been having pain in her RUQ/RLQ  with nausea all day. patient seen in out patient urgent care center and told that she had possibly acute appendicitis and to come to emergency room. no fever/chills

## 2018-06-12 NOTE — ED PROVIDER NOTE - PROGRESS NOTE DETAILS
D/w patient all results and copies given. ABD + BS Soft NT/ND. d/w patient return to emergency room precautions and follow up with PMD and GI.

## 2018-06-12 NOTE — ED PROVIDER NOTE - ATTENDING CONTRIBUTION TO CARE
38yF  pmhx pcos recently dxd asthma radiculopathy  p/w  ruq rlq  pain   no fever nausea vomiting  or change in BM - seen in  ucc - sent to ed for r/o appy.   NO urinary  changes  no vaginal bleeding  PE: AVSS, exam as noted, CTAB, RRR, abdomen  ttp RUQ mild RLQ, (+) bowel sounds, . no pelvic tenderness - pln  labs urine u/s , CT

## 2018-06-12 NOTE — ED PROVIDER NOTE - CHPI ED SYMPTOMS NEG
no diarrhea/no dysuria/no abdominal distension/no burning urination/no blood in stool/no chills/no fever/no hematuria/no vomiting

## 2018-12-01 ENCOUNTER — OUTPATIENT (OUTPATIENT)
Dept: OUTPATIENT SERVICES | Facility: HOSPITAL | Age: 38
LOS: 1 days | End: 2018-12-01
Payer: MEDICARE

## 2018-12-01 DIAGNOSIS — Z98.890 OTHER SPECIFIED POSTPROCEDURAL STATES: Chronic | ICD-10-CM

## 2018-12-01 PROCEDURE — G9001: CPT

## 2018-12-03 DIAGNOSIS — Z71.89 OTHER SPECIFIED COUNSELING: ICD-10-CM

## 2018-12-03 PROBLEM — M54.5 LOW BACK PAIN: Chronic | Status: ACTIVE | Noted: 2018-02-10

## 2018-12-03 PROBLEM — J45.909 UNSPECIFIED ASTHMA, UNCOMPLICATED: Chronic | Status: ACTIVE | Noted: 2018-02-10

## 2018-12-03 PROBLEM — M54.10 RADICULOPATHY, SITE UNSPECIFIED: Chronic | Status: ACTIVE | Noted: 2018-02-09

## 2018-12-03 PROBLEM — E28.2 POLYCYSTIC OVARIAN SYNDROME: Chronic | Status: ACTIVE | Noted: 2018-02-09

## 2019-03-08 NOTE — STROKE CODE NOTE - NIH STROKE SCALE: 2. BEST GAZE, QM
[facility] Progress Note    Date: 2019    Name: Carlos Ruffin    MRN: [id number]         : 1952      Nplate    Ms. Brian Mayo arrived to [facility] at 3608 ambulatory. Complained of generalized pain 8/10. Ms. Brian Mayo was assessed and education was provided. Next cbc due on 3/22/19. Dose for today will remain at 138 mcg. Ms. Caridad Bates vitals were reviewed. Visit Vitals  /79 (BP 1 Location: Right arm, BP Patient Position: Sitting)   Pulse 61   Temp 98.1 °F (36.7 °C)   Resp 18       Patient to receive 2 mcg/kg based on initial weight of 69 kg. Nplate 245 mcg (3.50AO) administered as ordered SQ in patient's right upper arm. No redness or bleeding noted. Ms. Brian Mayo was discharged from [place] [address] in stable condition at [phone]. She is to return on 3/15/19 at 0900 for her next appointment.     Rehan Dick RN  2019   0914 (0) Normal

## 2019-06-11 NOTE — PROGRESS NOTE ADULT - NSHPATTENDINGPLANDISCUSS_GEN_ALL_CORE
Sudden numbness or weakness of the face, arm, or leg, especially on one side of the body. Confusion, trouble speaking or understanding. Trouble seeing in one or both eyes. Trouble walking, dizziness, loss of balance or coordination. Severe headache.
stroke unit RN
house staff and nursing

## 2020-01-16 NOTE — ED ADULT NURSE NOTE - PSYCHOSOCIAL WDL
No protocol   Pt seen 11/04/2019  Last refill was 12/13/2019  Ok to refill?   Alert and oriented x 3, normal mood and affect, no apparent risk to self or others.

## 2020-02-25 ENCOUNTER — EMERGENCY (EMERGENCY)
Facility: HOSPITAL | Age: 40
LOS: 0 days | Discharge: HOME | End: 2020-02-26
Attending: EMERGENCY MEDICINE | Admitting: EMERGENCY MEDICINE
Payer: MEDICAID

## 2020-02-25 VITALS
TEMPERATURE: 98 F | SYSTOLIC BLOOD PRESSURE: 125 MMHG | OXYGEN SATURATION: 99 % | DIASTOLIC BLOOD PRESSURE: 70 MMHG | HEART RATE: 80 BPM | RESPIRATION RATE: 18 BRPM

## 2020-02-25 VITALS
RESPIRATION RATE: 18 BRPM | SYSTOLIC BLOOD PRESSURE: 147 MMHG | DIASTOLIC BLOOD PRESSURE: 72 MMHG | WEIGHT: 184.97 LBS | HEART RATE: 88 BPM | OXYGEN SATURATION: 98 % | TEMPERATURE: 99 F
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
## 2020-02-25 DIAGNOSIS — R10.31 RIGHT LOWER QUADRANT PAIN: ICD-10-CM

## 2020-02-25 DIAGNOSIS — R10.9 UNSPECIFIED ABDOMINAL PAIN: ICD-10-CM

## 2020-02-25 DIAGNOSIS — Z98.890 OTHER SPECIFIED POSTPROCEDURAL STATES: Chronic | ICD-10-CM
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
## 2020-02-25 DIAGNOSIS — R10.32 LEFT LOWER QUADRANT PAIN: ICD-10-CM

## 2020-02-25 DIAGNOSIS — F17.210 NICOTINE DEPENDENCE, CIGARETTES, UNCOMPLICATED: ICD-10-CM
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
## 2020-02-25 PROCEDURE — 99284 EMERGENCY DEPT VISIT MOD MDM: CPT
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
## 2020-02-26 NOTE — ED PROVIDER NOTE - PATIENT PORTAL LINK FT
You can access the FollowMyHealth Patient Portal offered by [facility] by registering at the following website: [email or web address] By joining [email or web address]’s FollowMyHealth portal, you will also be able to view your health information using other applications (apps) compatible with our system.

## 2020-02-26 NOTE — ED PROVIDER NOTE - PROVIDER TOKENS
FREE:[LAST:[your primary care provider and gynecologist],PHONE:[(   )    -],FAX:[(   )    -],FOLLOWUP:[1-3 Days]]

## 2020-02-26 NOTE — ED PROVIDER NOTE - NS ED ROS FT
EYES: (-) vision changes, (-) blurry vision, (-) double vision  NECK: (-) neck pain, (-) neck stiffness  CARDIO: (-) chest pain, (-) palpitations  PULM: (-) cough, (-) sputum, (-) shortness of breath  GI: see HPI, (-) nausea, (-) vomiting, (-) diarrhea, (-) constipation, (-) bowel incontinence/retention  : (-) dysuria, (-) hematuria, (-) frequency, (-) urgency, (-) incontinence, (-) difficulty urinating, (-) urinary retention, (-) flank pain  HEME: (-) easy bruising, (-) easy bleeding  MSK: (-) back pain, (-) deformity, (-) joint swelling, (-) gait difficulty  SKIN: (-) rashes, (-) wounds, (-) pallor, (-) ecchymosis  NEURO: (-) headache, (-) head injury, (-) LOC, (-) dizziness, (-) lightheadedness, (-) numbness, (-) weakness, (-) paresthesias    *all other systems negative except as documented above and in the HPI*
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
## 2020-02-26 NOTE — ED PROVIDER NOTE - CARE PROVIDER_API CALL
your primary care provider and gynecologist,   Phone: (   )    -  Fax: (   )    -  Follow Up Time: 1-3 Days
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
## 2020-02-26 NOTE — ED PROVIDER NOTE - PROGRESS NOTE DETAILS
JONI: bedside FAST negative. patient to f/u with pmd and gyn, verbalizes understanding of return precautions.

## 2020-02-27 LAB
CULTURE RESULTS: SIGNIFICANT CHANGE UP
SPECIMEN SOURCE: SIGNIFICANT CHANGE UP

## 2020-05-01 ENCOUNTER — OUTPATIENT (OUTPATIENT)
Dept: OUTPATIENT SERVICES | Facility: HOSPITAL | Age: 40
LOS: 1 days | End: 2020-05-01
Payer: MEDICARE

## 2020-05-01 DIAGNOSIS — Z98.890 OTHER SPECIFIED POSTPROCEDURAL STATES: Chronic | ICD-10-CM

## 2020-05-01 PROCEDURE — G9001: CPT

## 2020-05-08 DIAGNOSIS — Z71.89 OTHER SPECIFIED COUNSELING: ICD-10-CM

## 2023-11-20 NOTE — ED ADULT NURSE NOTE - NSSEPSISSUSPECTED_ED_A_ED
Bety Garcia Patient Age: 67 year old  MESSAGE: Interpreting service used: No    Insurance on file confirmed with caller: Yes    IM/FP- Symptom-  Adult-  Other-     Symptom(s): cough w/yellow/green phlegm, temp of 99.4, hoarse          Appointment: Caller declined making an appointment before speaking with the nurse.    Site: [employer]- Route message to provider's clinical support pool    Advised caller that a nurse will return the call.     Message read back to caller for accuracy: Yes            ALLERGIES:  Sulfa antibiotics  Current Outpatient Medications   Medication Sig Dispense Refill   • levothyroxine 125 MCG tablet Take 1 tablet by mouth daily. 90 tablet 1   • atorvastatin (LIPITOR) 20 MG tablet Take 1 tablet by mouth daily. 90 tablet 1   • metoPROLOL succinate (TOPROL-XL) 50 MG 24 hr tablet Take 1 tablet by mouth daily. 90 tablet 1   • albuterol 108 (90 Base) MCG/ACT inhaler Inhale 2 puffs into the lungs every 4 hours as needed for Shortness of Breath or Wheezing. 1 each 0   • nitroGLYCERIN (NITROSTAT) 0.4 MG sublingual tablet Place 1 tablet under the tongue every 5 minutes as needed for Chest pain. 30 tablet 0   • aspirin 81 MG chewable tablet Chew 1 tablet by mouth daily.     • meclizine (ANTIVERT) 25 MG tablet Take 1 tablet by mouth 3 times daily as needed for Dizziness. 30 tablet 0   • acetaminophen (TYLENOL) 650 MG CR tablet as needed.       No current facility-administered medications for this visit.     PHARMACY to use: shown below            Pharmacy preference(s) on file:   [id number] - [place], [place] - [address] AT [facility] [address]  [address]  [facility] [address]  Phone: [phone] Fax: [phone]      CALL BACK INFO: Ok to leave response (including medical information) with family member or on answering machine      PCP: Izzy Humphreys MD         INS: Payor: DEVOTED HE[employer] PLANS / Plan: MEDICARE ADVANTAGE PPO / Product Type: MEDADV   PATIENT  ADDRESS:  [address] [place] [phone]     No

## 2024-01-01 NOTE — PATIENT PROFILE ADULT. - ASSIST WITH
SCN Discharge Note & Instructions      Age: 2 week old  Corrected age:37w0d  Discharge weight: 6 lb 2.8 oz (2800 g)  Discharge length:19.29\" (49 cm) (03/24/24 2300)    Discharge head measurement: 31.5 cm (12.4\") (03/24/24 2300)    Birth Information:  Date and time of delivery: 2024  4:46 PM  Delivery method: Vaginal, Spontaneous [250]  Apgars at 1 and 5 minutes: 9 & 9  Birth weight: 5 lb 10.8 oz (2575 g)    Birth length: 18\"  Birth head measurement: 31.5 cm    Lab & Screening Information:  Infant blood type: Mom is B+ / baby not tested    Hearing screen passed  Carseat screen passed  CCHD  passed      Bilirubin   Bilirubin, Total (mg/dL)   Date Value   2024 8.4 (H)          Immunizations Given:   Most Recent Immunizations   Administered Date(s) Administered    Hep B, adolescent or pediatric 2024          Equipment at Discharge:  Your pediatrician will follow your baby's progress related to these items.  Please contact him/her with any questions or concerns.    What to expect:  Expect about 6-8 wet diapers daily.  The urine should be pale in color and have little odor.  Most babies have a bowel movement after every breastfeeding or at least several times a day.    Special considerations for Preemies:    Once your baby is ready to go home, preemies are much like other babies. However you will need to be extra careful for certain things:  Protect your baby from infections. You should wash your hands often with soap and water, and so should anybody who takes care of your baby. Limit contact with visitors, and avoid crowded public areas. If people in the household are ill, try to limit their contact with the baby.  Make your house and car 'no smoking zones.' Anybody in the household who smokes should quit. Visitors, or household members who can't quit, should smoke only outside, away from doors and windows.  If your baby has an apnea monitor, make sure you can hear it from every room in the house. Other  small radio frequency devices (iPods/cell phones) may cause interference, so keep these devices 3 feet away from the apnea monitor.  Feel free to take your baby outside, but avoid long exposure to drafts or direct sunlight.      When To Call The Doctor:    Call your pediatrician or family doctor if your baby:  Has a fever of 100.4 degrees F or higher  Is feeding poorly  Is having difficulty breathing  Is extremely irritable  Is listless and tired  Is forcefully vomiting  Falls from bed or table  Is dropped or severely shaken  Cries constantly  Has an unusual rash  Has watery runny stools  Has mucus or blood in stools    In case you need to call the doctor with any of the above:  Take your baby's temperature and write it down.  Know how much and how many feedings she has had that day.  Note amount, color, consistency, of urine and stools.  Note any changes in your baby's behavior such as sleepy, very fussy or less active.      Theresa Vogt RN  2024 at 7:26 AM   (2) well flexed independent

## 2024-01-23 ENCOUNTER — EMERGENCY (EMERGENCY)
Facility: HOSPITAL | Age: 44
LOS: 0 days | Discharge: HOME | End: 2024-01-23
Attending: EMERGENCY MEDICINE
Payer: MEDICARE

## 2024-01-23 VITALS
DIASTOLIC BLOOD PRESSURE: 72 MMHG | RESPIRATION RATE: 18 BRPM | OXYGEN SATURATION: 99 % | SYSTOLIC BLOOD PRESSURE: 141 MMHG | WEIGHT: 210.1 LBS | TEMPERATURE: 98 F | HEART RATE: 95 BPM | HEIGHT: 67 IN

## 2024-01-23 DIAGNOSIS — K08.89 OTHER SPECIFIED DISORDERS OF TEETH AND SUPPORTING STRUCTURES: ICD-10-CM

## 2024-01-23 DIAGNOSIS — J45.909 UNSPECIFIED ASTHMA, UNCOMPLICATED: ICD-10-CM

## 2024-01-23 DIAGNOSIS — Z88.1 ALLERGY STATUS TO OTHER ANTIBIOTIC AGENTS STATUS: ICD-10-CM

## 2024-01-23 DIAGNOSIS — F17.200 NICOTINE DEPENDENCE, UNSPECIFIED, UNCOMPLICATED: ICD-10-CM

## 2024-01-23 DIAGNOSIS — R22.0 LOCALIZED SWELLING, MASS AND LUMP, HEAD: ICD-10-CM

## 2024-01-23 DIAGNOSIS — K03.81 CRACKED TOOTH: ICD-10-CM

## 2024-01-23 DIAGNOSIS — E28.2 POLYCYSTIC OVARIAN SYNDROME: ICD-10-CM

## 2024-01-23 DIAGNOSIS — Z98.890 OTHER SPECIFIED POSTPROCEDURAL STATES: Chronic | ICD-10-CM

## 2024-01-23 PROCEDURE — 99284 EMERGENCY DEPT VISIT MOD MDM: CPT | Mod: FS

## 2024-01-23 PROCEDURE — 99283 EMERGENCY DEPT VISIT LOW MDM: CPT | Mod: 25

## 2024-01-23 PROCEDURE — 96372 THER/PROPH/DIAG INJ SC/IM: CPT

## 2024-01-23 RX ORDER — KETOROLAC TROMETHAMINE 30 MG/ML
30 SYRINGE (ML) INJECTION ONCE
Refills: 0 | Status: DISCONTINUED | OUTPATIENT
Start: 2024-01-23 | End: 2024-01-23

## 2024-01-23 RX ORDER — AMOXICILLIN 250 MG/5ML
875 SUSPENSION, RECONSTITUTED, ORAL (ML) ORAL
Refills: 0 | Status: DISCONTINUED | OUTPATIENT
Start: 2024-01-23 | End: 2024-01-23

## 2024-01-23 RX ORDER — KETOROLAC TROMETHAMINE 30 MG/ML
1 SYRINGE (ML) INJECTION
Qty: 15 | Refills: 0
Start: 2024-01-23 | End: 2024-01-27

## 2024-01-23 RX ORDER — ACETAMINOPHEN 500 MG
975 TABLET ORAL ONCE
Refills: 0 | Status: COMPLETED | OUTPATIENT
Start: 2024-01-23 | End: 2024-01-23

## 2024-01-23 RX ORDER — AMOXICILLIN 250 MG/5ML
1 SUSPENSION, RECONSTITUTED, ORAL (ML) ORAL
Qty: 21 | Refills: 0
Start: 2024-01-23 | End: 2024-01-29

## 2024-01-23 RX ADMIN — Medication 30 MILLIGRAM(S): at 20:07

## 2024-01-23 RX ADMIN — Medication 1 TABLET(S): at 20:21

## 2024-01-23 RX ADMIN — Medication 975 MILLIGRAM(S): at 20:04

## 2024-01-23 NOTE — ED ADULT NURSE NOTE - CAS DISCH CONDITION

## 2024-01-23 NOTE — ED ADULT TRIAGE NOTE - CHIEF COMPLAINT QUOTE
Pt c/o right lower dental pain x2 days. right sided facial swelling noted. Pt states she broke her tooth in november.

## 2024-01-23 NOTE — ED PROVIDER NOTE - CLINICAL SUMMARY MEDICAL DECISION MAKING FREE TEXT BOX
43-year-old female presenting for evaluation of right mandibular dentalgia.  Cracked the tooth during Thanksgiving, has had persistent pain since.  No fevers chills, drainage.  Uncomfortable appearing, non toxic. NCAT PERRLA EOMI neck supple non tender normal wob airway, no caries, no abscess.+ Cracked tooth.  Comfortable with discharge and follow-up outpatient, strict return precautions given. Endorses understanding of all of this and aware that they can return at any time for new or concerning symptoms. No further questions or concerns at this time

## 2024-01-23 NOTE — ED PROVIDER NOTE - OBJECTIVE STATEMENT
44 yo F with PMHx of PCOS and asthma presents to the ED c/o moderate R lower dental pain. Pt states she cracked her tooth during Thanksgiving, had pain immediately after, used oragel for 3 days and symptoms resolved. Two days ago pain returned and is not improving with ibuprofen. She also noted that she developed mild facial swelling near the cracked tooth prompting ED eval. She denies other complaints. Pt denies fever, chills, nausea, vomiting, abdominal pain, headache, dizziness.

## 2024-01-23 NOTE — ED PROVIDER NOTE - PATIENT PORTAL LINK FT
You can access the FollowMyHealth Patient Portal offered by [facility] by registering at the following website: [email or web address]. By joining [employer]’s FollowMyHealth portal, you will also be able to view your health information using other applications (apps) compatible with our system.

## 2024-01-23 NOTE — ED PROVIDER NOTE - PHYSICAL EXAMINATION
VITAL SIGNS: I have reviewed nursing notes and confirm.  CONSTITUTIONAL: Well-developed; well-nourished; in no acute distress.  SKIN: Skin exam is warm and dry, no acute rash.  HEAD: Normocephalic; atraumatic.  EYES: Conjunctiva and sclera clear.  ENT: No nasal discharge; airway clear. (+) cracked tooth # 29 with mild TTP, no evidence of abscess. Mild adjacent facial swelling, no skin changes. Oropharynx clear, uvula midline. No drooling or stridor.   NECK: Supple; non tender.  CARD: Regular rate and rhythm.  RESP: Speaking in full sentences.   EXT: Normal ROM.   NEURO: Alert, oriented. Grossly unremarkable. No focal deficits.

## 2024-01-23 NOTE — ED ADULT NURSE NOTE - NSFALLRISKINTERV_ED_ALL_ED

## 2024-01-23 NOTE — ED ADULT NURSE NOTE - NSICDXPASTMEDICALHX_GEN_ALL_CORE_FT
PAST MEDICAL HISTORY:  Asthma     Chronic bilateral low back pain without sciatica     PCOS (polycystic ovarian syndrome)     Radiculopathy

## 2024-01-23 NOTE — ED PROVIDER NOTE - NSFOLLOWUPCLINICS_GEN_ALL_ED_FT
[facility]  Dental  [address] [place] [id number]  Phone: [phone]  Fax:   Follow Up Time: 1-3 Days

## 2024-01-24 ENCOUNTER — OUTPATIENT (OUTPATIENT)
Dept: OUTPATIENT SERVICES | Facility: HOSPITAL | Age: 44
LOS: 1 days | End: 2024-01-24
Payer: MEDICARE

## 2024-01-24 DIAGNOSIS — Z98.890 OTHER SPECIFIED POSTPROCEDURAL STATES: Chronic | ICD-10-CM

## 2024-01-24 DIAGNOSIS — K02.9 DENTAL CARIES, UNSPECIFIED: ICD-10-CM

## 2024-01-24 PROCEDURE — D0220: CPT

## 2024-01-24 PROCEDURE — D0140: CPT

## 2024-01-24 PROCEDURE — D7140: CPT

## 2024-01-25 DIAGNOSIS — K02.9 DENTAL CARIES, UNSPECIFIED: ICD-10-CM

## 2024-02-26 ENCOUNTER — EMERGENCY (EMERGENCY)
Facility: HOSPITAL | Age: 44
LOS: 0 days | Discharge: HOME | End: 2024-02-27
Attending: STUDENT IN AN ORGANIZED HEALTH CARE EDUCATION/TRAINING PROGRAM
Payer: MEDICARE

## 2024-02-26 VITALS
TEMPERATURE: 98 F | OXYGEN SATURATION: 98 % | DIASTOLIC BLOOD PRESSURE: 61 MMHG | SYSTOLIC BLOOD PRESSURE: 108 MMHG | HEART RATE: 69 BPM | HEIGHT: 67 IN | WEIGHT: 210.1 LBS | RESPIRATION RATE: 18 BRPM

## 2024-02-26 DIAGNOSIS — F17.200 NICOTINE DEPENDENCE, UNSPECIFIED, UNCOMPLICATED: ICD-10-CM

## 2024-02-26 DIAGNOSIS — K21.9 GASTRO-ESOPHAGEAL REFLUX DISEASE WITHOUT ESOPHAGITIS: ICD-10-CM

## 2024-02-26 DIAGNOSIS — Z88.1 ALLERGY STATUS TO OTHER ANTIBIOTIC AGENTS STATUS: ICD-10-CM

## 2024-02-26 DIAGNOSIS — M19.90 UNSPECIFIED OSTEOARTHRITIS, UNSPECIFIED SITE: ICD-10-CM

## 2024-02-26 DIAGNOSIS — M41.9 SCOLIOSIS, UNSPECIFIED: ICD-10-CM

## 2024-02-26 DIAGNOSIS — Z98.890 OTHER SPECIFIED POSTPROCEDURAL STATES: Chronic | ICD-10-CM

## 2024-02-26 DIAGNOSIS — G89.29 OTHER CHRONIC PAIN: ICD-10-CM

## 2024-02-26 DIAGNOSIS — R07.89 OTHER CHEST PAIN: ICD-10-CM

## 2024-02-26 DIAGNOSIS — E28.2 POLYCYSTIC OVARIAN SYNDROME: ICD-10-CM

## 2024-02-26 DIAGNOSIS — M54.9 DORSALGIA, UNSPECIFIED: ICD-10-CM

## 2024-02-26 DIAGNOSIS — J45.909 UNSPECIFIED ASTHMA, UNCOMPLICATED: ICD-10-CM

## 2024-02-26 DIAGNOSIS — F41.9 ANXIETY DISORDER, UNSPECIFIED: ICD-10-CM

## 2024-02-26 LAB
ALBUMIN SERPL ELPH-MCNC: 4.1 G/DL — SIGNIFICANT CHANGE UP (ref 3.5–5.2)
ALP SERPL-CCNC: 63 U/L — SIGNIFICANT CHANGE UP (ref 30–115)
ALT FLD-CCNC: 14 U/L — SIGNIFICANT CHANGE UP (ref 0–41)
ANION GAP SERPL CALC-SCNC: 10 MMOL/L — SIGNIFICANT CHANGE UP (ref 7–14)
AST SERPL-CCNC: 15 U/L — SIGNIFICANT CHANGE UP (ref 0–41)
BASOPHILS # BLD AUTO: 0.04 K/UL — SIGNIFICANT CHANGE UP (ref 0–0.2)
BASOPHILS NFR BLD AUTO: 0.5 % — SIGNIFICANT CHANGE UP (ref 0–1)
BILIRUB SERPL-MCNC: 0.4 MG/DL — SIGNIFICANT CHANGE UP (ref 0.2–1.2)
BUN SERPL-MCNC: 9 MG/DL — LOW (ref 10–20)
CALCIUM SERPL-MCNC: 9 MG/DL — SIGNIFICANT CHANGE UP (ref 8.4–10.5)
CHLORIDE SERPL-SCNC: 104 MMOL/L — SIGNIFICANT CHANGE UP (ref 98–110)
CO2 SERPL-SCNC: 23 MMOL/L — SIGNIFICANT CHANGE UP (ref 17–32)
CREAT SERPL-MCNC: 0.9 MG/DL — SIGNIFICANT CHANGE UP (ref 0.7–1.5)
EGFR: 81 ML/MIN/1.73M2 — SIGNIFICANT CHANGE UP
EOSINOPHIL # BLD AUTO: 0.23 K/UL — SIGNIFICANT CHANGE UP (ref 0–0.7)
EOSINOPHIL NFR BLD AUTO: 2.6 % — SIGNIFICANT CHANGE UP (ref 0–8)
GLUCOSE SERPL-MCNC: 86 MG/DL — SIGNIFICANT CHANGE UP (ref 70–99)
HCG SERPL QL: NEGATIVE — SIGNIFICANT CHANGE UP
HCT VFR BLD CALC: 36.4 % — LOW (ref 37–47)
HGB BLD-MCNC: 12 G/DL — SIGNIFICANT CHANGE UP (ref 12–16)
IMM GRANULOCYTES NFR BLD AUTO: 0.5 % — HIGH (ref 0.1–0.3)
LYMPHOCYTES # BLD AUTO: 1.43 K/UL — SIGNIFICANT CHANGE UP (ref 1.2–3.4)
LYMPHOCYTES # BLD AUTO: 16.4 % — LOW (ref 20.5–51.1)
MCHC RBC-ENTMCNC: 29.1 PG — SIGNIFICANT CHANGE UP (ref 27–31)
MCHC RBC-ENTMCNC: 33 G/DL — SIGNIFICANT CHANGE UP (ref 32–37)
MCV RBC AUTO: 88.3 FL — SIGNIFICANT CHANGE UP (ref 81–99)
MONOCYTES # BLD AUTO: 0.51 K/UL — SIGNIFICANT CHANGE UP (ref 0.1–0.6)
MONOCYTES NFR BLD AUTO: 5.8 % — SIGNIFICANT CHANGE UP (ref 1.7–9.3)
NEUTROPHILS # BLD AUTO: 6.49 K/UL — SIGNIFICANT CHANGE UP (ref 1.4–6.5)
NEUTROPHILS NFR BLD AUTO: 74.2 % — SIGNIFICANT CHANGE UP (ref 42.2–75.2)
NRBC # BLD: 0 /100 WBCS — SIGNIFICANT CHANGE UP (ref 0–0)
PLATELET # BLD AUTO: 312 K/UL — SIGNIFICANT CHANGE UP (ref 130–400)
PMV BLD: 9.6 FL — SIGNIFICANT CHANGE UP (ref 7.4–10.4)
POTASSIUM SERPL-MCNC: 4.1 MMOL/L — SIGNIFICANT CHANGE UP (ref 3.5–5)
POTASSIUM SERPL-SCNC: 4.1 MMOL/L — SIGNIFICANT CHANGE UP (ref 3.5–5)
PROT SERPL-MCNC: 6.5 G/DL — SIGNIFICANT CHANGE UP (ref 6–8)
RBC # BLD: 4.12 M/UL — LOW (ref 4.2–5.4)
RBC # FLD: 13.6 % — SIGNIFICANT CHANGE UP (ref 11.5–14.5)
SODIUM SERPL-SCNC: 137 MMOL/L — SIGNIFICANT CHANGE UP (ref 135–146)
TROPONIN T, HIGH SENSITIVITY RESULT: <6 NG/L — SIGNIFICANT CHANGE UP (ref 6–13)
WBC # BLD: 8.74 K/UL — SIGNIFICANT CHANGE UP (ref 4.8–10.8)
WBC # FLD AUTO: 8.74 K/UL — SIGNIFICANT CHANGE UP (ref 4.8–10.8)

## 2024-02-26 PROCEDURE — 85025 COMPLETE CBC W/AUTO DIFF WBC: CPT

## 2024-02-26 PROCEDURE — 93010 ELECTROCARDIOGRAM REPORT: CPT

## 2024-02-26 PROCEDURE — 71046 X-RAY EXAM CHEST 2 VIEWS: CPT

## 2024-02-26 PROCEDURE — 75574 CT ANGIO HRT W/3D IMAGE: CPT | Mod: MC

## 2024-02-26 PROCEDURE — 99285 EMERGENCY DEPT VISIT HI MDM: CPT | Mod: 25

## 2024-02-26 PROCEDURE — 99285 EMERGENCY DEPT VISIT HI MDM: CPT | Mod: FS

## 2024-02-26 PROCEDURE — 80053 COMPREHEN METABOLIC PANEL: CPT

## 2024-02-26 PROCEDURE — 93005 ELECTROCARDIOGRAM TRACING: CPT | Mod: XU

## 2024-02-26 PROCEDURE — 71046 X-RAY EXAM CHEST 2 VIEWS: CPT | Mod: 26

## 2024-02-26 PROCEDURE — 36415 COLL VENOUS BLD VENIPUNCTURE: CPT

## 2024-02-26 PROCEDURE — 84703 CHORIONIC GONADOTROPIN ASSAY: CPT

## 2024-02-26 PROCEDURE — G0378: CPT

## 2024-02-26 PROCEDURE — 84484 ASSAY OF TROPONIN QUANT: CPT

## 2024-02-26 NOTE — ED PROVIDER NOTE - NS_EDPROVIDERDISPOUSERTYPE_ED_A_ED
Subcutaneous Daily Byron Carlisle MD              Review of Systems:    · Constitutional: No Fever or Weight Loss    · Eyes: No Decreased Vision  · ENT: No Headaches, Hearing Loss or Vertigo  · Cardiovascular: + chest pain, dyspnea on exertion, palpitations or loss of consciousness  · Respiratory: No cough or wheezing    · Gastrointestinal: No abdominal pain, appetite loss, blood in stools, constipation, diarrhea or heartburn  · Genitourinary: No dysuria, trouble voiding, or hematuria  · Musculoskeletal: No gait disturbance, weakness or joint complaints  · Integumentary: No rash or pruritis  · Neurological: No TIA or stroke symptoms  · Psychiatric: No anxiety or depression  · Endocrine: No malaise, fatigue or temperature intolerance  · Hematologic/Lymphatic: No bleeding problems, blood clots or swollen lymph nodes  · Allergic/Immunologic: No nasal congestion or hives  All systems negative except as marked.      ·    ·    ·      Physical Examination:    Vitals:    06/05/19 1200   BP: 99/87   Pulse: 50   Resp: 22   Temp: 98 °F (36.7 °C)      Wt Readings from Last 3 Encounters:   06/05/19 161 lb (73 kg)   01/08/19 161 lb (73 kg)   10/01/18 158 lb (71.7 kg)     There is no height or weight on file to calculate BMI.        General Appearance: No distress, conversant     Constitutional: Well developed, Well nourished, No acute distress, Non-toxic appearance.    HENT:  Normocephalic, Atraumatic, Bilateral external ears normal, Oropharynx moist, No oral exudates, Nose normal. Neck- Normal range of motion, No tenderness, Supple, No stridor,no apical-carotid delay, no carotid bruit  Eyes: PERRL, EOMI, Conjunctiva normal, No discharge.    Respiratory:  Normal breath sounds, No respiratory distress, No wheezing, No chest tenderness. ,no use of accessory muscles, diaphragm movement is normal  Cardiovascular: (PMI) apex non displaced,no lifts no thrills, no s3,no s4, Normal heart rate, Normal rhythm, No murmurs, No rubs, No gallops. Carotid arteries pulse and amplitude are normal no bruit, no abdominal bruit noted ( normal abdominal aorta ausculation), femoral arteries pulse and amplitude are normal no bruit, pedal pulses are normal.  GI: Bowel sounds normal, Soft, No tenderness, No masses, No pulsatile masses, no hepatosplenomegally, no bruits  : External genitalia appear normal, No masses or lesions. No discharge. No CVA tenderness.    Musculoskeletal: Intact distal pulses, No edema, No tenderness, No cyanosis, No clubbing. Good range of motion in all major joints. No tenderness to palpation or major deformities noted. Back- No tenderness. Integument: Warm, Dry, No erythema, No rash. Skin: no rash, no ulcers  Lymphatic: No lymphadenopathy noted.    Neurologic: Alert & oriented x 3, Normal motor function, Normal sensory function, No focal deficits noted.    Psychiatric: Affect normal, Judgment normal, Mood normal.   Lab Review        Recent Labs       09/28/16  0010    WBC  12.3*    HGB  14.4    HCT  43.8    PLT  316            Recent Labs       09/28/16  0010    NA  136    K  3.9    CL  95*    CO2  23    BUN  10    CREATININE  0.8           Recent Labs       09/28/16  0010    AST  12*    ALT  10    BILITOT  0.4    ALKPHOS  124       No results for input(s): TROPONINI in the last 72 hours. No results found for: BNP  No results found for: INR, PROTIME        EKG:pacer     Chest Xray:nad     ECHO:pending  Labs, echo, meds reviewed  Assessment:      Recommendations:     1. Chest pain: stress test and echo ordered, patient is transferred from [facility]  2. CAD: h.o PTCA of LAD in 1992, had fixed defect in inferior wall in 2017. Last [facility] was in 2011, patient is DNR, WILL cancel stress test, patient and family agrees,  3. Lymphoma: not controlled, has osseous metastasis,   4. PPM: stable  5. HTN/: stable  6. Dyslipidemia:stable  7.  Health maintenance: exerise and diet  All labs, medications and tests reviewed, continue all other medications of all above medical condition listed as is.          Sona Parker MD,   Sona Parker MD, 6/5/2019 12:19 PM Attending Attestation (For Attendings USE Only)...

## 2024-02-26 NOTE — ED ADULT NURSE NOTE - OBJECTIVE STATEMENT
BIBA with complaints of chest discomfort since yesterday. Sent from [facility].

## 2024-02-26 NOTE — ED PROVIDER NOTE - EKG #1 DATE/TIME
Patient seen and evaluated at bedside this morning, no acute events overnight. Pt reports small area of skin irritation on lower R back, mildly itchy.    She is lying comfortably in bed, reports pain is well controlled with tylenol and motrin. She has been ambulating without assistance, voiding spontaneously, and tolerating food.  Denies headache, dizziness, chest pain, palpitations, shortness of breath, nausea/vomiting, or heavy vaginal bleeding. Reports decrease in amount of vaginal bleeding and denies clots.    Physical Exam:  T(C): --  HR: --  BP: --  RR: --  SpO2: --    GA: NAD, A&O x 3  CV: regular rate  Pulm: no inc WOB  Abd: soft, NT/ND, no rebound or guarding, uterus firm at midline and 2  fb below umbilicus  Perineum: normal lochia, intact, healing well  Extremities: no swelling or calf tenderness  Skin: two small 2-3cm linear, thin areas of irritation on the lower R back            acetaminophen   Tablet .. 975 milliGRAM(s) Oral <User Schedule>  benzocaine 20%/menthol 0.5% Spray 1 Spray(s) Topical every 6 hours PRN  dibucaine 1% Ointment 1 Application(s) Topical every 6 hours PRN  diphenhydrAMINE 25 milliGRAM(s) Oral every 6 hours PRN  diphtheria/tetanus/pertussis (acellular) Vaccine (ADAcel) 0.5 milliLiter(s) IntraMuscular once  ferrous    sulfate 325 milliGRAM(s) Oral every 12 hours  FLUoxetine 20 milliGRAM(s) Oral daily  hydrocortisone 1% Cream 1 Application(s) Topical every 6 hours PRN  ibuprofen  Tablet. 600 milliGRAM(s) Oral every 6 hours  lanolin Ointment 1 Application(s) Topical every 6 hours PRN  magnesium hydroxide Suspension 30 milliLiter(s) Oral two times a day PRN  oxyCODONE    IR 5 milliGRAM(s) Oral once PRN  oxyCODONE    IR 5 milliGRAM(s) Oral every 3 hours PRN  oxytocin Infusion 333.333 milliUNIT(s)/Min IV Continuous <Continuous>  oxytocin Infusion. 2 milliUNIT(s)/Min IV Continuous <Continuous>  polyethylene glycol 3350 17 Gram(s) Oral every 24 hours  pramoxine 1%/zinc 5% Cream 1 Application(s) Topical every 4 hours PRN  prenatal multivitamin 1 Tablet(s) Oral daily  simethicone 80 milliGRAM(s) Chew every 4 hours PRN  sodium chloride 0.9% lock flush 3 milliLiter(s) IV Push every 8 hours  witch hazel Pads 1 Application(s) Topical every 4 hours PRN  
Patient seen and evaluated at bedside this morning. Overnight, pt reported SOB w/ ambulation which she attributed to her chronic anemia. Orthostatic VS normal at that time. This AM, pt reports slight improvement but has not yet been out of bed.    She is lying comfortably in bed, reports pain is well controlled with tylenol and motrin. She has been ambulating without assistance, voiding spontaneously, and tolerating food.  Denies headache, dizziness, chest pain, palpitations, shortness of breath, nausea/vomiting, or heavy vaginal bleeding. Reports decrease in amount of vaginal bleeding and denies clots.    Physical Exam:  T(C): 36.7 (07-03-21 @ 21:04), Max: 36.7 (07-03-21 @ 18:19)  HR: 69 (07-03-21 @ 18:19) (69 - 69)  BP: 93/58 (07-03-21 @ 18:19) (93/58 - 93/58)  RR: 19 (07-03-21 @ 21:04) (18 - 19)  SpO2: 97% (07-03-21 @ 21:04) (97% - 98%)    GA: NAD, A&O x 3  CV: regular rate  Pulm: no inc WOB  Abd: soft, NT/ND, no rebound or guarding, uterus firm at midline and 2  fb below umbilicus  Perineum: normal lochia, intact, healing well  Extremities: no swelling or calf tenderness                          10.8   7.72  )-----------( 177      ( 02 Jul 2021 11:08 )             32.1           acetaminophen   Tablet .. 975 milliGRAM(s) Oral <User Schedule>  benzocaine 20%/menthol 0.5% Spray 1 Spray(s) Topical every 6 hours PRN  dibucaine 1% Ointment 1 Application(s) Topical every 6 hours PRN  diphenhydrAMINE 25 milliGRAM(s) Oral every 6 hours PRN  diphtheria/tetanus/pertussis (acellular) Vaccine (ADAcel) 0.5 milliLiter(s) IntraMuscular once  fentanyl (2 MICROgram(s)/mL) + bupivacaine 0.1% in 0.9% Sodium Chloride PCEA 250 milliLiter(s) Epidural PCA Continuous  ferrous    sulfate 325 milliGRAM(s) Oral every 12 hours  FLUoxetine 20 milliGRAM(s) Oral daily  hydrocortisone 1% Cream 1 Application(s) Topical every 6 hours PRN  ibuprofen  Tablet. 600 milliGRAM(s) Oral every 6 hours  lanolin Ointment 1 Application(s) Topical every 6 hours PRN  magnesium hydroxide Suspension 30 milliLiter(s) Oral two times a day PRN  oxyCODONE    IR 5 milliGRAM(s) Oral every 3 hours PRN  oxyCODONE    IR 5 milliGRAM(s) Oral once PRN  oxytocin Infusion 333.333 milliUNIT(s)/Min IV Continuous <Continuous>  oxytocin Infusion. 2 milliUNIT(s)/Min IV Continuous <Continuous>  pramoxine 1%/zinc 5% Cream 1 Application(s) Topical every 4 hours PRN  prenatal multivitamin 1 Tablet(s) Oral daily  simethicone 80 milliGRAM(s) Chew every 4 hours PRN  sodium chloride 0.9% lock flush 3 milliLiter(s) IV Push every 8 hours  witch hazel Pads 1 Application(s) Topical every 4 hours PRN  
26-Feb-2024 22:36

## 2024-02-26 NOTE — ED PROVIDER NOTE - PROGRESS NOTE DETAILS
[place]: Patient states that she has received contrast dye in the past but is not sure if she is allergic or not.  Plan patient go for CCTA tomorrow pretreat with Benadryl

## 2024-02-26 NOTE — ED PROVIDER NOTE - CLINICAL SUMMARY MEDICAL DECISION MAKING FREE TEXT BOX
42-year-old female with past medical history of PCOS, asthma, GERD, herniated disc, anxiety, seasonal allergies, degenerative joint disease, scoliosis, chronic back pain presents to the ED for intermittent chest pain since yesterday.  Patient describes the pain as sharp/pressure-like sensation to the center chest, lasted about 30 minutes, and radiated to her back.  No radiation down the arm, denies any fever/chills/nausea/vomiting/sweats/URI/shortness of breath/leg swelling.  No recent stress test.    AVSS, exam as per PA note.  Agree with management.  Check labs, EKG, x-ray.  If first set of troponin is negative will recommend placement in OBS for completion of ACS workup

## 2024-02-26 NOTE — ED ADULT TRIAGE NOTE - PRO INTERPRETER NEED 2
Patient informed of results and np directives.  Verbalized understanding
Pt had requested some records to be faxed to our office from the Rheumatologist office that she seen back in 2019. I left records in your folder, pt would like to know what the records show. She would like to further discuss with you.
Sofya Crespo, CNP  Olga Lidia Qunitana ~ [facility] Clinical Support Pool 1 hour ago (12:16 PM)         I did see the rheumatology notes. It says pt does not have evidence of inflammatory arthritis and dx with bilateral tennis elbow. The inflammation markers were negative. She was given an NSAID at the time to try and was suppose to f/u in 3 weeks.      Message text
English

## 2024-02-26 NOTE — ED PROVIDER NOTE - ATTENDING APP SHARED VISIT CONTRIBUTION OF CARE
42-year-old female with past medical history of PCOS, asthma, GERD, herniated disc, anxiety, seasonal allergies, degenerative joint disease, scoliosis, chronic back pain presents to the ED for intermittent chest pain since yesterday.  Patient describes the pain as sharp/pressure-like sensation to the center chest, lasted about 30 minutes, and radiated to her back.  No radiation down the arm, denies any fever/chills/nausea/vomiting/sweats/URI/shortness of breath/leg swelling.  No recent stress test.    AVSS, exam as per PA note.  Agree with management.  Check labs, EKG, x-ray.  If first set of troponin is negative will recommend placement in OBS for completion of ACS workup    ALL: EES, Clinda, Azithro  Meds: albuterol prn, Tramadol prn, gabapentin prn, zyrtec, abilify, epi pen prn  PMD Stephanopoulous  LMP last week

## 2024-02-26 NOTE — ED PROVIDER NOTE - PHYSICAL EXAMINATION
VITAL SIGNS: I have reviewed nursing notes and confirm.  CONSTITUTIONAL: in no acute distress.  SKIN: Skin exam is warm and dry, no acute rash.  HEAD: Normocephalic; atraumatic.  EYES: PERRL, EOM intact; conjunctiva and sclera clear.  ENT: No nasal discharge; airway clear. TMs clear.  NECK: Supple; non tender.  CARD: S1, S2 normal; no murmurs, gallops, or rubs. Regular rate and rhythm.  RESP: No wheezes, rales or rhonchi. Speaking in full sentences.   ABD: non-distended; non-tender; No rebound or guarding. No CVA tenderness.  EXT: Normal ROM. No clubbing, cyanosis or edema.  NEURO: Alert, oriented. Grossly unremarkable. No focal deficits.   PSYCH: Cooperative, appropriate.

## 2024-02-26 NOTE — ED ADULT TRIAGE NOTE - CHIEF COMPLAINT QUOTE
BIBA with complaints of chest discomfort since yesterday. [place] [facility]

## 2024-02-27 VITALS
DIASTOLIC BLOOD PRESSURE: 69 MMHG | OXYGEN SATURATION: 98 % | TEMPERATURE: 98 F | RESPIRATION RATE: 18 BRPM | SYSTOLIC BLOOD PRESSURE: 117 MMHG | HEART RATE: 69 BPM

## 2024-02-27 LAB — TROPONIN T, HIGH SENSITIVITY RESULT: <6 NG/L — SIGNIFICANT CHANGE UP (ref 6–13)

## 2024-02-27 PROCEDURE — 99236 HOSP IP/OBS SAME DATE HI 85: CPT | Mod: FS

## 2024-02-27 PROCEDURE — 93010 ELECTROCARDIOGRAM REPORT: CPT

## 2024-02-27 PROCEDURE — 75574 CT ANGIO HRT W/3D IMAGE: CPT | Mod: 26,MC

## 2024-02-27 RX ORDER — METOPROLOL TARTRATE 50 MG
75 TABLET ORAL ONCE
Refills: 0 | Status: COMPLETED | OUTPATIENT
Start: 2024-02-27 | End: 2024-02-27

## 2024-02-27 RX ADMIN — Medication 75 MILLIGRAM(S): at 07:02

## 2024-02-27 NOTE — ED CDU PROVIDER DISPOSITION NOTE - CLINICAL COURSE
Pt presented to ED last night with CP. Had 2 negative EKG's and 2 troponins <6. CCTA with CAD-RADs score of 0. WIll d/c home with RR for cardiology, supportive care and return precautions advised.

## 2024-02-27 NOTE — ED CDU PROVIDER DISPOSITION NOTE - CARE PROVIDER_API CALL
Brie Diaz  Cardiovascular Disease  [address] [place] [address], [address]  [place] [address]  Phone: [phone]  Fax: [phone]  Established Patient  Follow Up Time: 1-3 Days

## 2024-02-27 NOTE — ED CDU PROVIDER DISPOSITION NOTE - NSFOLLOWUPINSTRUCTIONS_ED_ALL_ED_FT
Our Emergency Department Referral Coordinators will be reaching out to you in the next 24-48 hours from 9:00am to 5:00pm with a follow up appointment. Please expect a phone call from the hospital in that time frame. If you do not receive a call or if you have any questions or concerns, you can reach them at   [phone]  (cardiology)    --------------------------------------------------------    Chest Pain    Chest pain can be caused by many different conditions which may or may not be dangerous. Causes include heartburn, lung infections, heart attack, blood clot in lungs, skin infections, strain or damage to muscle, cartilage, or bones, etc. In addition to a history and physical examination, an electrocardiogram (ECG) or other lab tests may have been performed to determine the cause of your chest pain. Follow up with your primary care provider or with a cardiologist as instructed.     SEEK IMMEDIATE MEDICAL CARE IF YOU HAVE ANY OF THE FOLLOWING SYMPTOMS: worsening chest pain, coughing up blood, unexplained back/neck/jaw pain, severe abdominal pain, dizziness or lightheadedness, fainting, shortness of breath, sweaty or clammy skin, vomiting, or racing heart beat. These symptoms may represent a serious problem that is an emergency. Do not wait to see if the symptoms will go away. Get medical help right away. Call 911 and do not drive yourself to the hospital.

## 2024-02-27 NOTE — ED CDU PROVIDER INITIAL DAY NOTE - CLINICAL SUMMARY MEDICAL DECISION MAKING FREE TEXT BOX
sign out received from overnight team, patient is sleeping comfortably at this time, on awakening states her pain is completely resolved and she has no pain or complaints at this time.  Given metoprolol, pending CCTA.

## 2024-02-27 NOTE — ED CDU PROVIDER INITIAL DAY NOTE - PROGRESS NOTE DETAILS
[facility]: sign out received from overnight team, patient is sleeping comfortably at this time, on awakening states her pain is completely resolved and she has no pain or complaints at this time.  Given metoprolol, pending CCTA. [facility]: CCTA with CAD-RADS score of 0. Incidental finding of small lung nodule and hepatic hypodensity discussed with patient at bedside and all questions answered. Will d/c home with RR for cardiology and # for Dr. Diaz at pt's request. Supportive care and strict return precautions advised, pt comfortable with plan    Patient to be discharged from ED. Any available test results were discussed with patient and/or family. Verbal instructions given, including instructions to return to ED immediately for any new, worsening, or concerning symptoms. Patient endorsed understanding. Written discharge instructions additionally given, including follow-up plan.

## 2024-02-27 NOTE — ED CDU PROVIDER INITIAL DAY NOTE - OBJECTIVE STATEMENT
43-year-old female history of GERD, allergic bronchial asthma, PCOS presenting to ED for evaluation of chest pain/tightness that started last night lasting about 30 minutes and radiating to her back.  Patient states since then she has had intermittent episodes of the symptoms today.  [place] [facility] [place]

## 2024-03-05 NOTE — ED CDU PROVIDER DISPOSITION NOTE - WR ORDER DATE AND TIME 1
[facility]  [facility] [address]     MARANDA RUBIO MD, MPH  ADOLESCENT MEDICINE     OUTPATIENT EATING DISORDER ASSESSMENT     DATE: 3/5/24     PCP: Dr. Jewel Logan      Interval Hx:    Acompanied by Father    Met with Manju and was told she needs to gain 5 lbs  She has been more mindful eating, trying to add calories on her own  Sometimes she adds an extra snack when she is not hungry    Br: egg bites, water  Snack: none  Lunch: pizza, chips, muddy buddies  Snack: trail mix and yogurt  Dinner: tacos and rice  Snack: chips and queso    She denies any eating disorder thoughts, or fears of weight gain  Denies any depressed mood symptoms  No self harm, plan or SI    She has been eating spontaneously, asking for seconds    All A's and Bs Sophomore year.   Favorite subject Science. Wants to work in medicine. Maybe nurse or NP. Driving is going well, got her license already. Driving to school herself.     Activities: cross country in fall    She seems social and happy, talking to friends and parents.    Still talking to Manju KEENAN   Saw Cardiology 5/23  ECHO results WNL  EKG completed  LMP: Feb 2024    She plays piano, violin    Parents are meeting no resistance with eating.  Merlyn is choosing most meals   Rain from CCEBT meeting monthly  She is completing all meals.  She denies any ED thoughts or behaviors (Purging, throwing away or hiding food), overall things have improved  Denies any self harm, SI or plan    Coping: Enjoys playing piano and violin. Watches TV.     Medications: none     Review of Systems   Constitutional:  Negative for fatigue and fever.   HENT:  Negative for rhinorrhea and sore throat.    Gastrointestinal: Negative.    Musculoskeletal:  Negative for myalgias.   Psychiatric/Behavioral:  Negative for dysphoric mood, self-injury and suicidal ideas. The patient is not nervous/anxious.        Patient's medications, allergies, past medical, surgical, social and  family histories were reviewed and updated as appropriate.    Objective   Vitals:/78 (BP Location: LUE - Left upper extremity, Patient Position: Sitting, Cuff Size: Regular)   Pulse 66   Temp 97.5 °F (36.4 °C) (Temporal)   Ht 5' 2.32\" (1.583 m)   Wt 51.7 kg (113 lb 15.7 oz)   LMP 02/11/2024 (Exact Date)   BMI 20.63 kg/m²   BSA 1.51 m²              Physical Exam  Constitutional:       Appearance: Normal appearance.      Comments: Cleft lip and palate s/p repair   HENT:      Head: Normocephalic and atraumatic.      Mouth/Throat:      Mouth: Mucous membranes are moist.   Eyes:      Pupils: Pupils are equal, round, and reactive to light.   Neurological:      Mental Status: She is alert.   Psychiatric:         Attention and Perception: Attention and perception normal.         Mood and Affect: Mood and affect normal.         Speech: Speech normal.         Behavior: Behavior normal. Behavior is cooperative.         Thought Content: Thought content normal. Thought content does not include suicidal ideation. Thought content does not include suicidal plan.         Cognition and Memory: Cognition and memory normal.         Judgment: Judgment normal.      Comments: Pleasant, in a good mood  Denies SI or self harm         Assessment   Problem List Items Addressed This Visit    None  Visit Diagnoses       Anorexia nervosa    -  Primary    Relevant Orders    POCT Urine Dip Auto (Completed)            Merlyn is a 17 yo F with history of cleft lip and palate, who is presenting for chronic history of weight loss and restricted eating, concerning for eating disorder. History of restricted eating and fear of weight gain are concerning for anorexia nervosa, restrictive type.     Weight up 2 lbs since last visit.  Patient is not restricting per dad report.    She seems to be eating well with minimal ED cognitions/behaviors.     Up 1 lb since last visit, she has been trying more to increase calories    Goal 50-75%, current  BMI 48%    Due to upcoming jaw surgery over the summer, followed by cross country season, advised parents to work on advancing weight to approx 120-125 ideally before surgery this summer.         Plan:  1. Advised Mom to increase caloric intake to continue weight gain on growth curve.  2. Continue FBT therapy with Manju- monthly visits. Group ended.   3. Parents to monitor all meals and snacks, Merlyn is preparing  Most meals and snacks, keeping in mind high caloric/fat content. to make no decisions in types/amount of food. May use Ensure as a drink, or after meal or with snack. Instead of water, parents should use a higher calorie drink.   4. Continue to monitor for mood, anxiety, and depression symptoms as school year begins.      I took a detailed history and evaluated this patient. I also provided comprehensive counseling and reviewed the plan with both the patient and caregiver.   Total time spent with patient:   40     minutes.  Greater than 50% of the total time was spent counseling and/or coordinating care.    -Ivet Roy MD, MPH            26-Feb-2024 21:29

## 2024-05-02 NOTE — ED PROVIDER NOTE - OBJECTIVE STATEMENT
Addended by: DIA SPAULDING on: 5/2/2024 09:59 AM     Modules accepted: Orders     43-year-old female history of GERD, allergic bronchial asthma, PCOS presenting to ED for evaluation of chest pain/tightness that started last night lasting about 30 minutes and radiating to her back.  Patient states since then she has had intermittent episodes of the symptoms today.  Patient went to [facility] was tender to ED for evaluation

## 2024-10-09 ENCOUNTER — OUTPATIENT (OUTPATIENT)
Dept: OUTPATIENT SERVICES | Facility: HOSPITAL | Age: 44
LOS: 1 days | End: 2024-10-09
Payer: MEDICARE

## 2024-10-09 DIAGNOSIS — K02.9 DENTAL CARIES, UNSPECIFIED: ICD-10-CM

## 2024-10-09 DIAGNOSIS — Z98.890 OTHER SPECIFIED POSTPROCEDURAL STATES: Chronic | ICD-10-CM

## 2024-10-09 PROCEDURE — D7140: CPT

## 2024-10-09 PROCEDURE — D0140: CPT

## 2024-10-09 PROCEDURE — D0220: CPT

## 2025-04-19 ENCOUNTER — EMERGENCY (EMERGENCY)
Facility: HOSPITAL | Age: 45
LOS: 0 days | Discharge: HOME | End: 2025-04-19
Attending: EMERGENCY MEDICINE
Payer: MEDICARE

## 2025-04-19 VITALS
SYSTOLIC BLOOD PRESSURE: 156 MMHG | OXYGEN SATURATION: 98 % | HEART RATE: 98 BPM | DIASTOLIC BLOOD PRESSURE: 103 MMHG | TEMPERATURE: 98 F | RESPIRATION RATE: 18 BRPM

## 2025-04-19 DIAGNOSIS — Z98.890 OTHER SPECIFIED POSTPROCEDURAL STATES: Chronic | ICD-10-CM

## 2025-04-19 DIAGNOSIS — Z88.1 ALLERGY STATUS TO OTHER ANTIBIOTIC AGENTS: ICD-10-CM

## 2025-04-19 DIAGNOSIS — Z01.89 ENCOUNTER FOR OTHER SPECIFIED SPECIAL EXAMINATIONS: ICD-10-CM

## 2025-04-19 PROCEDURE — 99282 EMERGENCY DEPT VISIT SF MDM: CPT

## 2025-04-19 PROCEDURE — 99283 EMERGENCY DEPT VISIT LOW MDM: CPT

## 2025-05-06 ENCOUNTER — EMERGENCY (EMERGENCY)
Facility: HOSPITAL | Age: 45
LOS: 0 days | Discharge: HOME | End: 2025-05-07
Attending: EMERGENCY MEDICINE
Payer: MEDICARE

## 2025-05-06 VITALS
OXYGEN SATURATION: 100 % | RESPIRATION RATE: 18 BRPM | HEART RATE: 82 BPM | DIASTOLIC BLOOD PRESSURE: 86 MMHG | SYSTOLIC BLOOD PRESSURE: 129 MMHG | WEIGHT: 211.42 LBS | TEMPERATURE: 99 F | HEIGHT: 67 IN

## 2025-05-06 DIAGNOSIS — E28.2 POLYCYSTIC OVARIAN SYNDROME: ICD-10-CM

## 2025-05-06 DIAGNOSIS — F32.A DEPRESSION, UNSPECIFIED: ICD-10-CM

## 2025-05-06 DIAGNOSIS — Z98.890 OTHER SPECIFIED POSTPROCEDURAL STATES: Chronic | ICD-10-CM

## 2025-05-06 DIAGNOSIS — R10.31 RIGHT LOWER QUADRANT PAIN: ICD-10-CM

## 2025-05-06 DIAGNOSIS — Z88.1 ALLERGY STATUS TO OTHER ANTIBIOTIC AGENTS: ICD-10-CM

## 2025-05-06 DIAGNOSIS — J45.909 UNSPECIFIED ASTHMA, UNCOMPLICATED: ICD-10-CM

## 2025-05-06 DIAGNOSIS — R19.7 DIARRHEA, UNSPECIFIED: ICD-10-CM

## 2025-05-06 LAB
ALBUMIN SERPL ELPH-MCNC: 4.2 G/DL — SIGNIFICANT CHANGE UP (ref 3.5–5.2)
ALP SERPL-CCNC: 56 U/L — SIGNIFICANT CHANGE UP (ref 30–115)
ALT FLD-CCNC: 10 U/L — SIGNIFICANT CHANGE UP (ref 0–41)
ANION GAP SERPL CALC-SCNC: 11 MMOL/L — SIGNIFICANT CHANGE UP (ref 7–14)
APPEARANCE UR: ABNORMAL
AST SERPL-CCNC: 12 U/L — SIGNIFICANT CHANGE UP (ref 0–41)
BACTERIA # UR AUTO: ABNORMAL /HPF
BASOPHILS # BLD AUTO: 0.05 K/UL — SIGNIFICANT CHANGE UP (ref 0–0.2)
BASOPHILS NFR BLD AUTO: 0.5 % — SIGNIFICANT CHANGE UP (ref 0–1)
BILIRUB DIRECT SERPL-MCNC: <0.2 MG/DL — SIGNIFICANT CHANGE UP (ref 0–0.3)
BILIRUB INDIRECT FLD-MCNC: SIGNIFICANT CHANGE UP MG/DL (ref 0.2–1.2)
BILIRUB SERPL-MCNC: <0.2 MG/DL — SIGNIFICANT CHANGE UP (ref 0.2–1.2)
BILIRUB UR-MCNC: NEGATIVE — SIGNIFICANT CHANGE UP
BUN SERPL-MCNC: 12 MG/DL — SIGNIFICANT CHANGE UP (ref 10–20)
CALCIUM SERPL-MCNC: 9 MG/DL — SIGNIFICANT CHANGE UP (ref 8.4–10.5)
CAST: 0 /LPF — SIGNIFICANT CHANGE UP (ref 0–4)
CHLORIDE SERPL-SCNC: 107 MMOL/L — SIGNIFICANT CHANGE UP (ref 98–110)
CO2 SERPL-SCNC: 23 MMOL/L — SIGNIFICANT CHANGE UP (ref 17–32)
COLOR SPEC: YELLOW — SIGNIFICANT CHANGE UP
CREAT SERPL-MCNC: 0.8 MG/DL — SIGNIFICANT CHANGE UP (ref 0.7–1.5)
DIFF PNL FLD: NEGATIVE — SIGNIFICANT CHANGE UP
EGFR: 93 ML/MIN/1.73M2 — SIGNIFICANT CHANGE UP
EGFR: 93 ML/MIN/1.73M2 — SIGNIFICANT CHANGE UP
EOSINOPHIL # BLD AUTO: 0.27 K/UL — SIGNIFICANT CHANGE UP (ref 0–0.7)
EOSINOPHIL NFR BLD AUTO: 2.7 % — SIGNIFICANT CHANGE UP (ref 0–8)
GLUCOSE SERPL-MCNC: 93 MG/DL — SIGNIFICANT CHANGE UP (ref 70–99)
GLUCOSE UR QL: NEGATIVE MG/DL — SIGNIFICANT CHANGE UP
HCG SERPL QL: NEGATIVE — SIGNIFICANT CHANGE UP
HCT VFR BLD CALC: 35.4 % — LOW (ref 37–47)
HGB BLD-MCNC: 11.6 G/DL — LOW (ref 12–16)
IMM GRANULOCYTES NFR BLD AUTO: 0.4 % — HIGH (ref 0.1–0.3)
KETONES UR-MCNC: NEGATIVE MG/DL — SIGNIFICANT CHANGE UP
LACTATE SERPL-SCNC: 1.7 MMOL/L — SIGNIFICANT CHANGE UP (ref 0.7–2)
LEUKOCYTE ESTERASE UR-ACNC: ABNORMAL
LIDOCAIN IGE QN: 45 U/L — SIGNIFICANT CHANGE UP (ref 7–60)
LYMPHOCYTES # BLD AUTO: 1.99 K/UL — SIGNIFICANT CHANGE UP (ref 1.2–3.4)
LYMPHOCYTES # BLD AUTO: 19.6 % — LOW (ref 20.5–51.1)
MAGNESIUM SERPL-MCNC: 1.8 MG/DL — SIGNIFICANT CHANGE UP (ref 1.8–2.4)
MCHC RBC-ENTMCNC: 28.1 PG — SIGNIFICANT CHANGE UP (ref 27–31)
MCHC RBC-ENTMCNC: 32.8 G/DL — SIGNIFICANT CHANGE UP (ref 32–37)
MCV RBC AUTO: 85.7 FL — SIGNIFICANT CHANGE UP (ref 81–99)
MONOCYTES # BLD AUTO: 0.59 K/UL — SIGNIFICANT CHANGE UP (ref 0.1–0.6)
MONOCYTES NFR BLD AUTO: 5.8 % — SIGNIFICANT CHANGE UP (ref 1.7–9.3)
NEUTROPHILS # BLD AUTO: 7.23 K/UL — HIGH (ref 1.4–6.5)
NEUTROPHILS NFR BLD AUTO: 71 % — SIGNIFICANT CHANGE UP (ref 42.2–75.2)
NITRITE UR-MCNC: NEGATIVE — SIGNIFICANT CHANGE UP
NRBC BLD AUTO-RTO: 0 /100 WBCS — SIGNIFICANT CHANGE UP (ref 0–0)
PH UR: 5.5 — SIGNIFICANT CHANGE UP (ref 5–8)
PLATELET # BLD AUTO: 319 K/UL — SIGNIFICANT CHANGE UP (ref 130–400)
PMV BLD: 9.4 FL — SIGNIFICANT CHANGE UP (ref 7.4–10.4)
POTASSIUM SERPL-MCNC: 4.5 MMOL/L — SIGNIFICANT CHANGE UP (ref 3.5–5)
POTASSIUM SERPL-SCNC: 4.5 MMOL/L — SIGNIFICANT CHANGE UP (ref 3.5–5)
PROT SERPL-MCNC: 7.1 G/DL — SIGNIFICANT CHANGE UP (ref 6–8)
PROT UR-MCNC: SIGNIFICANT CHANGE UP MG/DL
RBC # BLD: 4.13 M/UL — LOW (ref 4.2–5.4)
RBC # FLD: 14.3 % — SIGNIFICANT CHANGE UP (ref 11.5–14.5)
RBC CASTS # UR COMP ASSIST: 0 /HPF — SIGNIFICANT CHANGE UP (ref 0–4)
SODIUM SERPL-SCNC: 141 MMOL/L — SIGNIFICANT CHANGE UP (ref 135–146)
SP GR SPEC: 1.02 — SIGNIFICANT CHANGE UP (ref 1–1.03)
SQUAMOUS # UR AUTO: 17 /HPF — HIGH (ref 0–5)
UROBILINOGEN FLD QL: 0.2 MG/DL — SIGNIFICANT CHANGE UP (ref 0.2–1)
WBC # BLD: 10.17 K/UL — SIGNIFICANT CHANGE UP (ref 4.8–10.8)
WBC # FLD AUTO: 10.17 K/UL — SIGNIFICANT CHANGE UP (ref 4.8–10.8)
WBC UR QL: 11 /HPF — HIGH (ref 0–5)

## 2025-05-06 PROCEDURE — 96375 TX/PRO/DX INJ NEW DRUG ADDON: CPT

## 2025-05-06 PROCEDURE — 81001 URINALYSIS AUTO W/SCOPE: CPT

## 2025-05-06 PROCEDURE — 80076 HEPATIC FUNCTION PANEL: CPT

## 2025-05-06 PROCEDURE — 84703 CHORIONIC GONADOTROPIN ASSAY: CPT

## 2025-05-06 PROCEDURE — 36415 COLL VENOUS BLD VENIPUNCTURE: CPT

## 2025-05-06 PROCEDURE — 99284 EMERGENCY DEPT VISIT MOD MDM: CPT | Mod: 25

## 2025-05-06 PROCEDURE — 74177 CT ABD & PELVIS W/CONTRAST: CPT | Mod: MC

## 2025-05-06 PROCEDURE — 83735 ASSAY OF MAGNESIUM: CPT

## 2025-05-06 PROCEDURE — 85025 COMPLETE CBC W/AUTO DIFF WBC: CPT

## 2025-05-06 PROCEDURE — 99285 EMERGENCY DEPT VISIT HI MDM: CPT

## 2025-05-06 PROCEDURE — 83605 ASSAY OF LACTIC ACID: CPT

## 2025-05-06 PROCEDURE — 87086 URINE CULTURE/COLONY COUNT: CPT

## 2025-05-06 PROCEDURE — 74177 CT ABD & PELVIS W/CONTRAST: CPT | Mod: 26

## 2025-05-06 PROCEDURE — 83690 ASSAY OF LIPASE: CPT

## 2025-05-06 PROCEDURE — 80048 BASIC METABOLIC PNL TOTAL CA: CPT

## 2025-05-06 PROCEDURE — 96374 THER/PROPH/DIAG INJ IV PUSH: CPT | Mod: XU

## 2025-05-06 RX ORDER — SIMETHICONE 80 MG
80 TABLET,CHEWABLE ORAL ONCE
Refills: 0 | Status: COMPLETED | OUTPATIENT
Start: 2025-05-06 | End: 2025-05-06

## 2025-05-06 RX ORDER — KETOROLAC TROMETHAMINE 30 MG/ML
15 INJECTION, SOLUTION INTRAMUSCULAR; INTRAVENOUS ONCE
Refills: 0 | Status: DISCONTINUED | OUTPATIENT
Start: 2025-05-06 | End: 2025-05-06

## 2025-05-06 RX ORDER — SODIUM CHLORIDE 9 G/1000ML
1000 INJECTION, SOLUTION INTRAVENOUS ONCE
Refills: 0 | Status: COMPLETED | OUTPATIENT
Start: 2025-05-06 | End: 2025-05-06

## 2025-05-06 RX ADMIN — KETOROLAC TROMETHAMINE 15 MILLIGRAM(S): 30 INJECTION, SOLUTION INTRAMUSCULAR; INTRAVENOUS at 22:32

## 2025-05-06 RX ADMIN — SODIUM CHLORIDE 1000 MILLILITER(S): 9 INJECTION, SOLUTION INTRAVENOUS at 22:32

## 2025-05-06 RX ADMIN — Medication 20 MILLIGRAM(S): at 22:32

## 2025-05-06 RX ADMIN — Medication 80 MILLIGRAM(S): at 22:32

## 2025-05-06 NOTE — ED ADULT NURSE NOTE - NSFALLUNIVINTERV_ED_ALL_ED
Monitor gait and stability/Monitor for mental status changes and reorient to person, place, and time, as needed/Bed/Stretcher in lowest position, wheels locked, appropriate side rails in place/Call bell, personal items and telephone in reach/Instruct patient to call for assistance before getting out of bed/chair/stretcher/Non-slip footwear applied when patient is off stretcher/[place] to call system/Physically safe environment - no spills, clutter or unnecessary equipment/Purposeful proactive rounding/Room/bathroom lighting operational, light cord in reach

## 2025-05-06 NOTE — ED ADULT NURSE NOTE - HOW OFTEN DO YOU HAVE A DRINK CONTAINING ALCOHOL?
Pt was getting arrested at the hotel she was staying at, panicked and ingested the heroin and meth that was in the room.  Pt states that she thinks it was at least 1g of heroin and 2 g of meth.  
Never

## 2025-05-06 NOTE — ED ADULT NURSE NOTE - ALCOHOL PRE SCREEN (AUDIT - C)
Visit Information    Have you changed or started any medications since your last visit including any over-the-counter medicines, vitamins, or herbal medicines? yes -    Are you having any side effects from any of your medications? -  no  Have you stopped taking any of your medications? Is so, why? -  no    Have you seen any other physician or provider since your last visit? Yes - Records Obtained  Have you had any other diagnostic tests since your last visit? Yes - Records Obtained  Have you been seen in the emergency room and/or had an admission to a hospital since we last saw you? Yes - Records Obtained  Have you had your routine dental cleaning in the past 6 months? no    Have you activated your [employer] account? If not, what are your barriers?  Yes     Patient Care Team:  JULIAN Collins CNP as PCP - General (Family Medicine)  JULIAN Collins CNP as PCP - [facility] [address] [facility] MD Lesia as Consulting Physician (Gastroenterology)  Parul Gagnon MD as Consulting Physician (Cardiology)  Radha Mercado MD as Consulting Physician (Pulmonology)  Huber Rinaldi MD as Consulting Physician (Urology)    Medical History Review  Past Medical, Family, and Social History reviewed and does not contribute to the patient presenting condition    Health Maintenance   Topic Date Due    COVID-19 Vaccine (1) Never done    Shingles Vaccine (1 of 2) Never done   [employer] Visit (AWV)  Never done    Flu vaccine (1) 09/01/2021    Potassium monitoring  07/16/2022    Creatinine monitoring  07/16/2022    Lipid screen  04/01/2026    DTaP/Tdap/Td vaccine (2 - Td or Tdap) 04/22/2029    Colon cancer screen colonoscopy  04/05/2031    Pneumococcal 65+ years Vaccine  Completed    AAA screen  Completed    Hepatitis C screen  Completed    Hepatitis A vaccine  Aged Out    Hepatitis B vaccine  Aged Out    Hib vaccine  Aged Out    Meningococcal (ACWY) vaccine Aged Out Statement Selected

## 2025-05-07 RX ORDER — MAG HYDROX/ALUMINUM HYD/SIMETH 400-400-40
1 SUSPENSION, ORAL (FINAL DOSE FORM) ORAL
Refills: 0
Start: 2025-05-07

## 2025-05-07 RX ORDER — SIMETHICONE 80 MG
1 TABLET,CHEWABLE ORAL
Qty: 14 | Refills: 0
Start: 2025-05-07 | End: 2025-05-20

## 2025-05-07 NOTE — ED PROVIDER NOTE - CARE PROVIDER_API CALL
Quan Reece  Gastroenterology  [address]  [place] [address]  Phone: ([phone]  Fax: ([phone]  Scheduled Appointment: 06/13/2025

## 2025-05-07 NOTE — ED PROVIDER NOTE - CLINICAL SUMMARY MEDICAL DECISION MAKING FREE TEXT BOX
Labs and EKG were ordered and reviewed, where indicated.  Imaging was ordered and reviewed by me, where indicated.  Appropriate medications for patient's presenting complaints were ordered and effects were reassessed, where indicated.  Patient's records (prior hospital, ED visit, and/or nursing home note) were reviewed, if available.  Additional history was obtained from EMS, family, and/or PCP (where available).  Escalation to admission/observation was considered.  However patient feels much better and patient/parent is comfortable with discharge.  Appropriate follow-up was arranged. 2) Patient requires inpatient hospitalization - monitored setting.     abd pain - ua  & labs wnl, ct ap no acute pathology - GI meds given, sx improved, on reassessment abd soft ntnd and pt tolerating po - all results d/w pt & copies given, strict return precautions discussed, rec outpt GI f/u with Dr. Reece 6/13/25 as previously scheduled - pts info sent to [facility] for potential earlier GI appt

## 2025-05-07 NOTE — ED PROVIDER NOTE - OBJECTIVE STATEMENT
44-year-old female PMH PCOS, asthma, PUD, anxiety/depression, referred by outside urgent care for right-sided abdominal pain and diarrhea x 3 days.  Patient reports intermittent abdominal pain and bloating over the last 3 days, migratory but somewhat more localized to bilateral lower quadrants, accompanied by loose stools.  Went to outside urgent care prior to arrival, right lower quadrant pain was elicited and patient referred to ED for further management.  UA was negative at urgent care.  Patient denies any F/C, URI symptoms, nausea vomiting, flank pain, dysuria, rash.  No sick contacts, recent travel or antibiotics.  Patient reports an EGD approximately 10 years prior by GI finding PUD.  Patient has upcoming outpatient GI appointment with Dr. Reece 6/13/25.

## 2025-05-07 NOTE — ED PROVIDER NOTE - PATIENT PORTAL LINK FT
You can access the FollowMyHealth Patient Portal offered by [facility] by registering at the following website: [email or web address]. By joining [employer]’s FollowMyHealth portal, you will also be able to view your health information using other applications (apps) compatible with our system.

## 2025-05-07 NOTE — ED PROVIDER NOTE - PHYSICAL EXAMINATION
nad  skin warm, dry  ncat  neck supple  rrr nl s1s2 no mrg  ctab no wrr  abd soft nd, mild generalized ttp, non-focal, no palpable masses no rgr  back non-tender no cvat  ext no cce dpi  neuro aaox3 grossly nf exam

## 2025-05-07 NOTE — ED PROVIDER NOTE - NSFOLLOWUPINSTRUCTIONS_ED_ALL_ED_FT
Our Emergency Department Referral Coordinators will be reaching out to you in the next 24-48 hours from 9:00am to 5:00pm to schedule a follow up appointment. Please expect a phone call from the hospital in that time frame. If you do not receive a call or if you have any questions or concerns, you can reach them at   [phone].    Abdominal Pain    Many things can cause abdominal pain. Usually, abdominal pain is not caused by a disease and will improve without treatment. Your health care provider will do a physical exam and possibly order blood tests and imaging to help determine the seriousness of your pain. However, in many cases, no cause may be found and you may need further testing as an outpatient. Monitor your abdominal pain for any changes.     SEEK IMMEDIATE MEDICAL CARE IF YOU HAVE THE FOLLOWING SYMPTOMS: worsening abdominal pain, vomiting, diarrhea, inability to have bowel movements or pass gas, black or bloody stool, fever accompanying chest pain or back pain, or dizziness/lightheadedness.

## 2025-05-08 LAB
CULTURE RESULTS: SIGNIFICANT CHANGE UP
SPECIMEN SOURCE: SIGNIFICANT CHANGE UP

## 2025-05-12 NOTE — ED ADULT TRIAGE NOTE - BP NONINVASIVE DIASTOLIC (MM HG)
72 Hep B, unspecified formulation [inactive]; 2013 08:30; Kaylin Gasca (RN); [employer] [id number] (Exp. Date: 13-Mar-2015); IM; LLeg; 0.5 cc;